# Patient Record
Sex: MALE | Race: BLACK OR AFRICAN AMERICAN | NOT HISPANIC OR LATINO | URBAN - METROPOLITAN AREA
[De-identification: names, ages, dates, MRNs, and addresses within clinical notes are randomized per-mention and may not be internally consistent; named-entity substitution may affect disease eponyms.]

---

## 2017-03-05 ENCOUNTER — EMERGENCY (EMERGENCY)
Facility: HOSPITAL | Age: 62
LOS: 1 days | Discharge: HOME | End: 2017-03-05
Attending: EMERGENCY MEDICINE | Admitting: EMERGENCY MEDICINE
Payer: COMMERCIAL

## 2017-03-05 VITALS
OXYGEN SATURATION: 98 % | WEIGHT: 250 LBS | TEMPERATURE: 98 F | RESPIRATION RATE: 18 BRPM | HEART RATE: 66 BPM | SYSTOLIC BLOOD PRESSURE: 154 MMHG | DIASTOLIC BLOOD PRESSURE: 100 MMHG

## 2017-03-05 DIAGNOSIS — M54.5 LOW BACK PAIN: ICD-10-CM

## 2017-03-05 DIAGNOSIS — Z79.899 OTHER LONG TERM (CURRENT) DRUG THERAPY: ICD-10-CM

## 2017-03-05 DIAGNOSIS — M54.9 DORSALGIA, UNSPECIFIED: ICD-10-CM

## 2017-03-05 DIAGNOSIS — I10 ESSENTIAL (PRIMARY) HYPERTENSION: ICD-10-CM

## 2017-03-05 DIAGNOSIS — Z79.1 LONG TERM (CURRENT) USE OF NON-STEROIDAL ANTI-INFLAMMATORIES (NSAID): ICD-10-CM

## 2017-03-05 PROCEDURE — 81003 URINALYSIS AUTO W/O SCOPE: CPT

## 2017-03-05 PROCEDURE — 99283 EMERGENCY DEPT VISIT LOW MDM: CPT

## 2017-03-05 PROCEDURE — 87086 URINE CULTURE/COLONY COUNT: CPT

## 2017-03-05 RX ORDER — TRAMADOL HYDROCHLORIDE 50 MG/1
50 TABLET ORAL ONCE
Qty: 0 | Refills: 0 | Status: DISCONTINUED | OUTPATIENT
Start: 2017-03-05 | End: 2017-03-05

## 2017-03-05 RX ADMIN — TRAMADOL HYDROCHLORIDE 50 MILLIGRAM(S): 50 TABLET ORAL at 12:34

## 2017-03-05 NOTE — ED ADULT TRIAGE NOTE - CHIEF COMPLAINT QUOTE
low back pain that started 2 weeks ago after doing crunches 2 weeks ago.  Hx: UTI. Denies burning on urination, hematuria, fever, chills, n/v/d.

## 2017-03-05 NOTE — ED PROVIDER NOTE - OBJECTIVE STATEMENT
The pt is a 60 y/o M, who presents to ED c/o R sided back pain x 1 mon. Pt states pain is 7/10, pain to R back, non radiating, aggravated w/mov, has not taken any pain meds. States that his "kidneys are not functioning at full level" - was told this over a yr ago, never f/u w/pmd. Denies cp, sob, n/v/d, dysuria, hematuria, fevers, chills, numbness or tingling to toes, back trauma

## 2017-03-05 NOTE — ED ADULT NURSE NOTE - PMH
<<----- Click to add NO pertinent Past Medical History GERD (gastroesophageal reflux disease)    HTN (hypertension)

## 2017-03-05 NOTE — ED ADULT NURSE NOTE - OBJECTIVE STATEMENT
Pt CO Right Flank Pain 8/10 x2 weeks radiating to Abd.  Pt states "My PMD has given me Ibuprofen but I don't want to take it because I don't like taking all this medication."  Pt also CO Polyuria.  Pt denies N/V/D, SOB, Fevers at this time.

## 2017-07-22 ENCOUNTER — EMERGENCY (EMERGENCY)
Facility: HOSPITAL | Age: 62
LOS: 1 days | Discharge: HOME | End: 2017-07-22
Attending: EMERGENCY MEDICINE | Admitting: EMERGENCY MEDICINE
Payer: COMMERCIAL

## 2017-07-22 VITALS
RESPIRATION RATE: 17 BRPM | TEMPERATURE: 98 F | HEART RATE: 69 BPM | OXYGEN SATURATION: 98 % | DIASTOLIC BLOOD PRESSURE: 76 MMHG | SYSTOLIC BLOOD PRESSURE: 145 MMHG

## 2017-07-22 VITALS
DIASTOLIC BLOOD PRESSURE: 73 MMHG | HEART RATE: 68 BPM | SYSTOLIC BLOOD PRESSURE: 122 MMHG | TEMPERATURE: 98 F | RESPIRATION RATE: 17 BRPM | OXYGEN SATURATION: 97 % | WEIGHT: 250 LBS | HEIGHT: 74.5 IN

## 2017-07-22 DIAGNOSIS — Z95.0 PRESENCE OF CARDIAC PACEMAKER: Chronic | ICD-10-CM

## 2017-07-22 DIAGNOSIS — E78.5 HYPERLIPIDEMIA, UNSPECIFIED: ICD-10-CM

## 2017-07-22 DIAGNOSIS — M54.9 DORSALGIA, UNSPECIFIED: ICD-10-CM

## 2017-07-22 DIAGNOSIS — M62.838 OTHER MUSCLE SPASM: ICD-10-CM

## 2017-07-22 DIAGNOSIS — I10 ESSENTIAL (PRIMARY) HYPERTENSION: ICD-10-CM

## 2017-07-22 DIAGNOSIS — Z79.899 OTHER LONG TERM (CURRENT) DRUG THERAPY: ICD-10-CM

## 2017-07-22 LAB
APPEARANCE UR: CLEAR — SIGNIFICANT CHANGE UP
BILIRUB UR-MCNC: NEGATIVE — SIGNIFICANT CHANGE UP
COLOR SPEC: YELLOW — SIGNIFICANT CHANGE UP
DIFF PNL FLD: NEGATIVE — SIGNIFICANT CHANGE UP
GLUCOSE UR QL: NEGATIVE — SIGNIFICANT CHANGE UP
KETONES UR-MCNC: NEGATIVE — SIGNIFICANT CHANGE UP
LEUKOCYTE ESTERASE UR-ACNC: NEGATIVE — SIGNIFICANT CHANGE UP
NITRITE UR-MCNC: NEGATIVE — SIGNIFICANT CHANGE UP
PH UR: 6 — SIGNIFICANT CHANGE UP (ref 5–8)
PROT UR-MCNC: NEGATIVE MG/DL — SIGNIFICANT CHANGE UP
SP GR SPEC: 1.02 — SIGNIFICANT CHANGE UP (ref 1–1.03)
UROBILINOGEN FLD QL: 0.2 E.U./DL — SIGNIFICANT CHANGE UP

## 2017-07-22 PROCEDURE — 71046 X-RAY EXAM CHEST 2 VIEWS: CPT

## 2017-07-22 PROCEDURE — 93005 ELECTROCARDIOGRAM TRACING: CPT

## 2017-07-22 PROCEDURE — 96372 THER/PROPH/DIAG INJ SC/IM: CPT

## 2017-07-22 PROCEDURE — 99284 EMERGENCY DEPT VISIT MOD MDM: CPT

## 2017-07-22 PROCEDURE — 71020: CPT | Mod: 26

## 2017-07-22 PROCEDURE — 81003 URINALYSIS AUTO W/O SCOPE: CPT

## 2017-07-22 PROCEDURE — 99285 EMERGENCY DEPT VISIT HI MDM: CPT | Mod: 25

## 2017-07-22 PROCEDURE — 99283 EMERGENCY DEPT VISIT LOW MDM: CPT | Mod: 25

## 2017-07-22 RX ORDER — CYCLOBENZAPRINE HYDROCHLORIDE 10 MG/1
1 TABLET, FILM COATED ORAL
Qty: 9 | Refills: 0 | OUTPATIENT
Start: 2017-07-22 | End: 2017-07-25

## 2017-07-22 RX ORDER — KETOROLAC TROMETHAMINE 30 MG/ML
30 SYRINGE (ML) INJECTION ONCE
Qty: 0 | Refills: 0 | Status: DISCONTINUED | OUTPATIENT
Start: 2017-07-22 | End: 2017-07-22

## 2017-07-22 RX ADMIN — Medication 30 MILLIGRAM(S): at 16:13

## 2017-07-22 RX ADMIN — Medication 30 MILLIGRAM(S): at 16:45

## 2017-07-22 NOTE — ED ADULT NURSE NOTE - CHPI ED SYMPTOMS NEG
no chills/no burning urination/no dysuria/no nausea/no abdominal distension/no blood in stool/no diarrhea/no hematuria

## 2017-07-22 NOTE — ED ADULT TRIAGE NOTE - CHIEF COMPLAINT QUOTE
"I have pain to the left side of my back that started about 10 days ago and now I also feel it by the right side of my stomach. The pain is horrible when I move". Pt states moving, walking and getting up in the morning is very difficulty. States he saw his urologist this last wednesday and he states everything was ok. Pt denies recent injury.

## 2017-07-22 NOTE — ED PROVIDER NOTE - OBJECTIVE STATEMENT
62yo M hx of HTN, HLD, BPH, pacemaker for bradycardic a fib, here with complaint of a week of initially mild L lateral back pain, now worse. Worse with position changes and deep breaths. No associated N/V/D, fevers/chills. No pain elsewhere. No neurological symptoms. Denies midline back pain. Was at a routine  appt when the pain had begun, at that time had blood work and urine done that was normal, his doctor thought likely MSK. However worsened today so came in. Not sure if he picked up anything particularly heavy, and no trauma that he is aware of.

## 2017-07-22 NOTE — ED ADULT NURSE NOTE - PMH
BPH (benign prostatic hyperplasia)    GERD (gastroesophageal reflux disease)    High cholesterol    HTN (hypertension)

## 2017-07-22 NOTE — ED PROVIDER NOTE - MEDICAL DECISION MAKING DETAILS
here with likely muscle spasm of L lateral back. Will trial pain meds, and reassess. anticipate dc home with plan for supportive care and f/u PMD

## 2017-07-22 NOTE — ED PROVIDER NOTE - PHYSICAL EXAMINATION
CONSTITUTIONAL: Well-appearing; well-nourished; in no apparent distress.   HEAD: Normocephalic; atraumatic.   EYES:  conjunctiva and sclera clear  ENT: normal nose; no rhinorrhea; normal pharynx with no erythema or lesions.   NECK: Supple; non-tender;   CARDIOVASCULAR: Normal S1, S2; no murmurs, rubs, or gallops. Regular rate and rhythm.   RESPIRATORY: Breathing easily; breath sounds clear and equal bilaterally; no wheezes, rhonchi, or rales.  GI: Soft; non-distended; non-tender; no palpable organomegaly. No CVA tenderness.   EXT: No cyanosis or edema; N/V intact  MSK: no midline spinal tenderness. +tenderness to palpation over L lateral thoracic back, palpation recreates pain  SKIN: Normal for age and race; warm; dry; good turgor; no apparent lesions or rash.   NEURO: A & O x 3; face symmetric; grossly unremarkable.   PSYCHOLOGICAL: The patient’s mood and manner are appropriate.

## 2018-03-01 ENCOUNTER — EMERGENCY (EMERGENCY)
Facility: HOSPITAL | Age: 63
LOS: 1 days | Discharge: HOME | End: 2018-03-01
Attending: EMERGENCY MEDICINE | Admitting: EMERGENCY MEDICINE
Payer: COMMERCIAL

## 2018-03-01 VITALS
WEIGHT: 255.07 LBS | TEMPERATURE: 99 F | RESPIRATION RATE: 16 BRPM | HEIGHT: 74 IN | DIASTOLIC BLOOD PRESSURE: 86 MMHG | HEART RATE: 70 BPM | OXYGEN SATURATION: 97 % | SYSTOLIC BLOOD PRESSURE: 134 MMHG

## 2018-03-01 VITALS
HEART RATE: 63 BPM | SYSTOLIC BLOOD PRESSURE: 157 MMHG | OXYGEN SATURATION: 98 % | DIASTOLIC BLOOD PRESSURE: 76 MMHG | TEMPERATURE: 100 F | RESPIRATION RATE: 16 BRPM

## 2018-03-01 DIAGNOSIS — Z79.1 LONG TERM (CURRENT) USE OF NON-STEROIDAL ANTI-INFLAMMATORIES (NSAID): ICD-10-CM

## 2018-03-01 DIAGNOSIS — J06.9 ACUTE UPPER RESPIRATORY INFECTION, UNSPECIFIED: ICD-10-CM

## 2018-03-01 DIAGNOSIS — Z95.0 PRESENCE OF CARDIAC PACEMAKER: ICD-10-CM

## 2018-03-01 DIAGNOSIS — E78.00 PURE HYPERCHOLESTEROLEMIA, UNSPECIFIED: ICD-10-CM

## 2018-03-01 DIAGNOSIS — R53.1 WEAKNESS: ICD-10-CM

## 2018-03-01 DIAGNOSIS — Z79.899 OTHER LONG TERM (CURRENT) DRUG THERAPY: ICD-10-CM

## 2018-03-01 DIAGNOSIS — Z95.0 PRESENCE OF CARDIAC PACEMAKER: Chronic | ICD-10-CM

## 2018-03-01 DIAGNOSIS — I10 ESSENTIAL (PRIMARY) HYPERTENSION: ICD-10-CM

## 2018-03-01 LAB — RAPID RVP RESULT: SIGNIFICANT CHANGE UP

## 2018-03-01 PROCEDURE — 96374 THER/PROPH/DIAG INJ IV PUSH: CPT

## 2018-03-01 PROCEDURE — 36415 COLL VENOUS BLD VENIPUNCTURE: CPT

## 2018-03-01 PROCEDURE — 82550 ASSAY OF CK (CPK): CPT

## 2018-03-01 PROCEDURE — 87581 M.PNEUMON DNA AMP PROBE: CPT

## 2018-03-01 PROCEDURE — 99285 EMERGENCY DEPT VISIT HI MDM: CPT | Mod: 25

## 2018-03-01 PROCEDURE — 87486 CHLMYD PNEUM DNA AMP PROBE: CPT

## 2018-03-01 PROCEDURE — 87633 RESP VIRUS 12-25 TARGETS: CPT

## 2018-03-01 PROCEDURE — 93010 ELECTROCARDIOGRAM REPORT: CPT

## 2018-03-01 PROCEDURE — 85025 COMPLETE CBC W/AUTO DIFF WBC: CPT

## 2018-03-01 PROCEDURE — 80053 COMPREHEN METABOLIC PANEL: CPT

## 2018-03-01 PROCEDURE — 81003 URINALYSIS AUTO W/O SCOPE: CPT

## 2018-03-01 PROCEDURE — 71046 X-RAY EXAM CHEST 2 VIEWS: CPT | Mod: 26

## 2018-03-01 PROCEDURE — 84484 ASSAY OF TROPONIN QUANT: CPT

## 2018-03-01 PROCEDURE — 85730 THROMBOPLASTIN TIME PARTIAL: CPT

## 2018-03-01 PROCEDURE — 93005 ELECTROCARDIOGRAM TRACING: CPT

## 2018-03-01 PROCEDURE — 71046 X-RAY EXAM CHEST 2 VIEWS: CPT

## 2018-03-01 PROCEDURE — 82553 CREATINE MB FRACTION: CPT

## 2018-03-01 PROCEDURE — 87798 DETECT AGENT NOS DNA AMP: CPT

## 2018-03-01 PROCEDURE — 85610 PROTHROMBIN TIME: CPT

## 2018-03-01 PROCEDURE — 99284 EMERGENCY DEPT VISIT MOD MDM: CPT | Mod: 25

## 2018-03-01 RX ORDER — ONDANSETRON 8 MG/1
4 TABLET, FILM COATED ORAL ONCE
Qty: 0 | Refills: 0 | Status: COMPLETED | OUTPATIENT
Start: 2018-03-01 | End: 2018-03-01

## 2018-03-01 RX ADMIN — ONDANSETRON 4 MILLIGRAM(S): 8 TABLET, FILM COATED ORAL at 19:00

## 2018-03-01 NOTE — ED ADULT TRIAGE NOTE - CHIEF COMPLAINT QUOTE
pt c/o scratchy throat, weakness, and increased urination since yesterday. pt has hx of pacemaker & kidney stones

## 2018-03-01 NOTE — ED ADULT NURSE NOTE - OBJECTIVE STATEMENT
pt c.o sore throat, weakness, intermittent left sided chest pain and  productive cough since this morning. denies any sob, fever/chills.

## 2018-03-01 NOTE — PROCEDURE NOTE - ADDITIONAL PROCEDURE DETAILS
Normal device function as programmed  Persistent AF (patient reports known h/o AF managed by Dr. Sood @ [facility])  No other arrhythmia events noted

## 2018-03-01 NOTE — ED PROVIDER NOTE - OBJECTIVE STATEMENT
61 y/o male with pacemaker, chronic afib, HTN, with throat itching, + cough, feels very tired, weak, + cough past 2-3 days, + body aches. patient is concerned that this is cardiac related. St he has had multiple syncopal episodes in the past.  no recent syncope. last episode 4 months ago.

## 2018-03-01 NOTE — ED PROVIDER NOTE - MEDICAL DECISION MAKING DETAILS
61 y/o male with ? flu like symptoms vs. cardiac related weakness. 1) Cardiac- EP evaluation, interrogation of pacemaker no sig arrythmia noted besides afib rate 55. troponin normal, CXR- pacemaker leads intact, no infiltrate, EKG - a fib, with wide QRS, prolonged QT 2) will send RVP, given Zofran. Will start on Tamiflu as at 2-3 days, tessalon perles

## 2018-03-10 ENCOUNTER — INPATIENT (INPATIENT)
Facility: HOSPITAL | Age: 63
LOS: 2 days | Discharge: HOME | DRG: 312 | End: 2018-03-13
Attending: INTERNAL MEDICINE | Admitting: INTERNAL MEDICINE
Payer: COMMERCIAL

## 2018-03-10 VITALS
RESPIRATION RATE: 18 BRPM | WEIGHT: 250 LBS | HEART RATE: 64 BPM | TEMPERATURE: 98 F | SYSTOLIC BLOOD PRESSURE: 137 MMHG | OXYGEN SATURATION: 98 % | DIASTOLIC BLOOD PRESSURE: 80 MMHG

## 2018-03-10 DIAGNOSIS — R55 SYNCOPE AND COLLAPSE: ICD-10-CM

## 2018-03-10 DIAGNOSIS — N18.9 CHRONIC KIDNEY DISEASE, UNSPECIFIED: ICD-10-CM

## 2018-03-10 DIAGNOSIS — E78.00 PURE HYPERCHOLESTEROLEMIA, UNSPECIFIED: ICD-10-CM

## 2018-03-10 DIAGNOSIS — Z95.0 PRESENCE OF CARDIAC PACEMAKER: Chronic | ICD-10-CM

## 2018-03-10 DIAGNOSIS — I48.1 PERSISTENT ATRIAL FIBRILLATION: ICD-10-CM

## 2018-03-10 DIAGNOSIS — I10 ESSENTIAL (PRIMARY) HYPERTENSION: ICD-10-CM

## 2018-03-10 DIAGNOSIS — J32.9 CHRONIC SINUSITIS, UNSPECIFIED: ICD-10-CM

## 2018-03-10 LAB
ALBUMIN SERPL ELPH-MCNC: 4.3 G/DL — SIGNIFICANT CHANGE UP (ref 3.3–5)
ALP SERPL-CCNC: 74 U/L — SIGNIFICANT CHANGE UP (ref 40–120)
ALT FLD-CCNC: 36 U/L — SIGNIFICANT CHANGE UP (ref 10–45)
ANION GAP SERPL CALC-SCNC: 11 MMOL/L — SIGNIFICANT CHANGE UP (ref 5–17)
ANION GAP SERPL CALC-SCNC: 17 MMOL/L — SIGNIFICANT CHANGE UP (ref 5–17)
APPEARANCE UR: CLEAR — SIGNIFICANT CHANGE UP
APTT BLD: 23.8 SEC — LOW (ref 27.5–37.4)
AST SERPL-CCNC: 36 U/L — SIGNIFICANT CHANGE UP (ref 10–40)
BASOPHILS NFR BLD AUTO: 0.2 % — SIGNIFICANT CHANGE UP (ref 0–2)
BILIRUB SERPL-MCNC: 0.4 MG/DL — SIGNIFICANT CHANGE UP (ref 0.2–1.2)
BILIRUB UR-MCNC: NEGATIVE — SIGNIFICANT CHANGE UP
BUN SERPL-MCNC: 14 MG/DL — SIGNIFICANT CHANGE UP (ref 7–23)
BUN SERPL-MCNC: 14 MG/DL — SIGNIFICANT CHANGE UP (ref 7–23)
CALCIUM SERPL-MCNC: 8.2 MG/DL — LOW (ref 8.4–10.5)
CALCIUM SERPL-MCNC: 8.8 MG/DL — SIGNIFICANT CHANGE UP (ref 8.4–10.5)
CHLORIDE SERPL-SCNC: 102 MMOL/L — SIGNIFICANT CHANGE UP (ref 96–108)
CHLORIDE SERPL-SCNC: 98 MMOL/L — SIGNIFICANT CHANGE UP (ref 96–108)
CHOLEST SERPL-MCNC: 199 MG/DL — SIGNIFICANT CHANGE UP (ref 10–199)
CK MB CFR SERPL CALC: 5.5 NG/ML — SIGNIFICANT CHANGE UP (ref 0–6.7)
CK SERPL-CCNC: 613 U/L — HIGH (ref 30–200)
CK SERPL-CCNC: 722 U/L — HIGH (ref 30–200)
CO2 SERPL-SCNC: 24 MMOL/L — SIGNIFICANT CHANGE UP (ref 22–31)
CO2 SERPL-SCNC: 27 MMOL/L — SIGNIFICANT CHANGE UP (ref 22–31)
COLOR SPEC: YELLOW — SIGNIFICANT CHANGE UP
CREAT SERPL-MCNC: 1.19 MG/DL — SIGNIFICANT CHANGE UP (ref 0.5–1.3)
CREAT SERPL-MCNC: 1.43 MG/DL — HIGH (ref 0.5–1.3)
DIFF PNL FLD: NEGATIVE — SIGNIFICANT CHANGE UP
EOSINOPHIL NFR BLD AUTO: 0.2 % — SIGNIFICANT CHANGE UP (ref 0–6)
GLUCOSE SERPL-MCNC: 102 MG/DL — HIGH (ref 70–99)
GLUCOSE SERPL-MCNC: 108 MG/DL — HIGH (ref 70–99)
GLUCOSE UR QL: NEGATIVE — SIGNIFICANT CHANGE UP
HCT VFR BLD CALC: 39.8 % — SIGNIFICANT CHANGE UP (ref 39–50)
HCT VFR BLD CALC: 44.2 % — SIGNIFICANT CHANGE UP (ref 39–50)
HDLC SERPL-MCNC: 34 MG/DL — LOW (ref 40–125)
HGB BLD-MCNC: 12.9 G/DL — LOW (ref 13–17)
HGB BLD-MCNC: 14.4 G/DL — SIGNIFICANT CHANGE UP (ref 13–17)
INR BLD: 1 — SIGNIFICANT CHANGE UP (ref 0.88–1.16)
KETONES UR-MCNC: NEGATIVE — SIGNIFICANT CHANGE UP
LEUKOCYTE ESTERASE UR-ACNC: NEGATIVE — SIGNIFICANT CHANGE UP
LIPID PNL WITH DIRECT LDL SERPL: 148 MG/DL — HIGH
LYMPHOCYTES # BLD AUTO: 24.5 % — SIGNIFICANT CHANGE UP (ref 13–44)
MAGNESIUM SERPL-MCNC: 2.1 MG/DL — SIGNIFICANT CHANGE UP (ref 1.6–2.6)
MAGNESIUM SERPL-MCNC: 2.3 MG/DL — SIGNIFICANT CHANGE UP (ref 1.6–2.6)
MCHC RBC-ENTMCNC: 30.8 PG — SIGNIFICANT CHANGE UP (ref 27–34)
MCHC RBC-ENTMCNC: 31.7 PG — SIGNIFICANT CHANGE UP (ref 27–34)
MCHC RBC-ENTMCNC: 32.4 G/DL — SIGNIFICANT CHANGE UP (ref 32–36)
MCHC RBC-ENTMCNC: 32.6 G/DL — SIGNIFICANT CHANGE UP (ref 32–36)
MCV RBC AUTO: 95 FL — SIGNIFICANT CHANGE UP (ref 80–100)
MCV RBC AUTO: 97.4 FL — SIGNIFICANT CHANGE UP (ref 80–100)
MONOCYTES NFR BLD AUTO: 7.1 % — SIGNIFICANT CHANGE UP (ref 2–14)
NEUTROPHILS NFR BLD AUTO: 68 % — SIGNIFICANT CHANGE UP (ref 43–77)
NITRITE UR-MCNC: NEGATIVE — SIGNIFICANT CHANGE UP
NT-PROBNP SERPL-SCNC: 666 PG/ML — HIGH (ref 0–300)
PCP SPEC-MCNC: SIGNIFICANT CHANGE UP
PH UR: 7.5 — SIGNIFICANT CHANGE UP (ref 5–8)
PHOSPHATE SERPL-MCNC: 3 MG/DL — SIGNIFICANT CHANGE UP (ref 2.5–4.5)
PLATELET # BLD AUTO: 176 K/UL — SIGNIFICANT CHANGE UP (ref 150–400)
PLATELET # BLD AUTO: 192 K/UL — SIGNIFICANT CHANGE UP (ref 150–400)
POTASSIUM SERPL-MCNC: 3.9 MMOL/L — SIGNIFICANT CHANGE UP (ref 3.5–5.3)
POTASSIUM SERPL-MCNC: 4.1 MMOL/L — SIGNIFICANT CHANGE UP (ref 3.5–5.3)
POTASSIUM SERPL-SCNC: 3.9 MMOL/L — SIGNIFICANT CHANGE UP (ref 3.5–5.3)
POTASSIUM SERPL-SCNC: 4.1 MMOL/L — SIGNIFICANT CHANGE UP (ref 3.5–5.3)
PROT SERPL-MCNC: 8.4 G/DL — HIGH (ref 6–8.3)
PROT UR-MCNC: NEGATIVE MG/DL — SIGNIFICANT CHANGE UP
PROTHROM AB SERPL-ACNC: 11.1 SEC — SIGNIFICANT CHANGE UP (ref 9.8–12.7)
RBC # BLD: 4.19 M/UL — LOW (ref 4.2–5.8)
RBC # BLD: 4.54 M/UL — SIGNIFICANT CHANGE UP (ref 4.2–5.8)
RBC # FLD: 12.7 % — SIGNIFICANT CHANGE UP (ref 10.3–16.9)
RBC # FLD: 13.1 % — SIGNIFICANT CHANGE UP (ref 10.3–16.9)
SODIUM SERPL-SCNC: 139 MMOL/L — SIGNIFICANT CHANGE UP (ref 135–145)
SODIUM SERPL-SCNC: 140 MMOL/L — SIGNIFICANT CHANGE UP (ref 135–145)
SP GR SPEC: 1.01 — SIGNIFICANT CHANGE UP (ref 1–1.03)
TOTAL CHOLESTEROL/HDL RATIO MEASUREMENT: 5.9 RATIO — SIGNIFICANT CHANGE UP (ref 3.4–9.6)
TRIGL SERPL-MCNC: 85 MG/DL — SIGNIFICANT CHANGE UP (ref 10–149)
TROPONIN T SERPL-MCNC: <0.01 NG/ML — SIGNIFICANT CHANGE UP (ref 0–0.01)
TROPONIN T SERPL-MCNC: <0.01 NG/ML — SIGNIFICANT CHANGE UP (ref 0–0.01)
TSH SERPL-MCNC: 0.26 UIU/ML — LOW (ref 0.35–4.94)
UROBILINOGEN FLD QL: 1 E.U./DL — SIGNIFICANT CHANGE UP
WBC # BLD: 4.2 K/UL — SIGNIFICANT CHANGE UP (ref 3.8–10.5)
WBC # BLD: 4.4 K/UL — SIGNIFICANT CHANGE UP (ref 3.8–10.5)
WBC # FLD AUTO: 4.2 K/UL — SIGNIFICANT CHANGE UP (ref 3.8–10.5)
WBC # FLD AUTO: 4.4 K/UL — SIGNIFICANT CHANGE UP (ref 3.8–10.5)

## 2018-03-10 PROCEDURE — 93010 ELECTROCARDIOGRAM REPORT: CPT

## 2018-03-10 PROCEDURE — 99285 EMERGENCY DEPT VISIT HI MDM: CPT | Mod: 25

## 2018-03-10 PROCEDURE — 93018 CV STRESS TEST I&R ONLY: CPT

## 2018-03-10 PROCEDURE — 78452 HT MUSCLE IMAGE SPECT MULT: CPT | Mod: 26

## 2018-03-10 PROCEDURE — 70450 CT HEAD/BRAIN W/O DYE: CPT | Mod: 26

## 2018-03-10 PROCEDURE — 93016 CV STRESS TEST SUPVJ ONLY: CPT

## 2018-03-10 RX ORDER — NIFEDIPINE 30 MG
0 TABLET, EXTENDED RELEASE 24 HR ORAL
Qty: 0 | Refills: 0 | COMMUNITY

## 2018-03-10 RX ORDER — ESOMEPRAZOLE MAGNESIUM 40 MG/1
0 CAPSULE, DELAYED RELEASE ORAL
Qty: 0 | Refills: 0 | COMMUNITY

## 2018-03-10 RX ORDER — ACETAMINOPHEN 500 MG
650 TABLET ORAL EVERY 6 HOURS
Qty: 0 | Refills: 0 | Status: DISCONTINUED | OUTPATIENT
Start: 2018-03-10 | End: 2018-03-13

## 2018-03-10 RX ORDER — INFLUENZA VIRUS VACCINE 15; 15; 15; 15 UG/.5ML; UG/.5ML; UG/.5ML; UG/.5ML
0.5 SUSPENSION INTRAMUSCULAR ONCE
Qty: 0 | Refills: 0 | Status: DISCONTINUED | OUTPATIENT
Start: 2018-03-10 | End: 2018-03-13

## 2018-03-10 RX ORDER — SODIUM CHLORIDE 9 MG/ML
1000 INJECTION INTRAMUSCULAR; INTRAVENOUS; SUBCUTANEOUS ONCE
Qty: 0 | Refills: 0 | Status: COMPLETED | OUTPATIENT
Start: 2018-03-10 | End: 2018-03-10

## 2018-03-10 RX ORDER — ASPIRIN/CALCIUM CARB/MAGNESIUM 324 MG
81 TABLET ORAL DAILY
Qty: 0 | Refills: 0 | Status: DISCONTINUED | OUTPATIENT
Start: 2018-03-10 | End: 2018-03-13

## 2018-03-10 RX ADMIN — SODIUM CHLORIDE 1000 MILLILITER(S): 9 INJECTION INTRAMUSCULAR; INTRAVENOUS; SUBCUTANEOUS at 00:52

## 2018-03-10 RX ADMIN — Medication 1 TABLET(S): at 02:43

## 2018-03-10 RX ADMIN — Medication 81 MILLIGRAM(S): at 15:16

## 2018-03-10 RX ADMIN — Medication 1 TABLET(S): at 17:00

## 2018-03-10 NOTE — H&P ADULT - PROBLEM SELECTOR PLAN 5
Pt with abnormal outpatient US and pending nephro appointment. s/p 1L IVF in ED - recheck am Cr and consider additional IVF.  Increased po intake encouraged.

## 2018-03-10 NOTE — PROGRESS NOTE ADULT - PROBLEM SELECTOR PLAN 5
Pt with abnormal outpatient US and pending nephro appointment. s/p 1L IVF in ED - BUN/Cr normalized this AM 14/1.19    DISPO: Pending clinical progression - continue augmentin (started 3/10) for sinusitis noted on CT head

## 2018-03-10 NOTE — H&P ADULT - PMH
Atrial fibrillation    BPH (benign prostatic hyperplasia)    GERD (gastroesophageal reflux disease)    High cholesterol    HTN (hypertension)    Syncope, unspecified syncope type

## 2018-03-10 NOTE — H&P ADULT - FAMILY HISTORY
No pertinent family history in first degree relatives Mother  Still living? Unknown  Coronary artery disease, Age at diagnosis: Age Unknown  Diabetes mellitus, type 2, Age at diagnosis: Age Unknown  Family history of hypertension, Age at diagnosis: Age Unknown

## 2018-03-10 NOTE — DIETITIAN INITIAL EVALUATION ADULT. - PROBLEM SELECTOR PLAN 1
Admit for tele monitoring.  PPM interrogated 3/1 with proper function, underlying AF.  Consider echo. Check TFTs, lytes. Hold Nifedipine.

## 2018-03-10 NOTE — DIETITIAN INITIAL EVALUATION ADULT. - OTHER INFO
61yo M with family h/o CAD, HTN, HLD, PPM with underlying AFIB presents with recurrent syncope.  Pt reports 4 to 5 episodes over the past 12 months with negative workup to date, including EEG, echo and stress test. Reports eating normally PTA, however eating a lot of [employer] & fast food. Wanted advice on changing diet & eating healthier. Discussed heart healthy diet & referral to outpatient.

## 2018-03-10 NOTE — ED PROVIDER NOTE - MEDICAL DECISION MAKING DETAILS
syncopal episode, no chest pain, no SOB, HA, however no focal neuro deficits, doubt CVA, doubt SAH, doutb PE  -check labs, ekg, ivf, ct head

## 2018-03-10 NOTE — PROGRESS NOTE ADULT - PROBLEM SELECTOR PLAN 6
Pt with abnormal outpatient US and pending nephro appointment. s/p 1L IVF in ED - BUN/Cr normalized this AM 14/1.19    DISPO: Pending clinical progression

## 2018-03-10 NOTE — H&P ADULT - HISTORY OF PRESENT ILLNESS
This is a 61yo M h/o HTN, HLD, Afib with PPM who presents to ED with recurrent syncope.    PPM interrogated here 3/1 showed normal function, normal battery, underlying Afib @ 55bpm. This is a 63yo M h/o HTN, HLD, Afib with PPM who presents to ED with recurrent syncope.    States was on the train, felt sudden onset nausea with vomiting, LOC x 20seconds witnessed by wife.  Pt reports h/o multiple syncopal episodes, EEG negative last year.  Was last seen at [facility] 3/1 for c/o flu-like symptoms, treated with tamiflu and tessalon perles, RVP negative. PPM interrogated here 3/1 showed normal function, normal battery, underlying Afib @ 55bpm.    In the ED tonight, VSS, EKG paced, CT head negative for acute CVA, c/w left sinusitis treated with Augmentin and 1L IVF.  Labs were notable for elevated Cr of 1.43 c/w prior. K 3.9, Mg 2.3, , Trop <0.01, . This is a 63yo M former 75 pack-year smoker, h/o HTN, HLD, Afib with PPM (reportedly secondary to bradycardia) who presents to ED with recurrent syncope.    States he had 2 alcohol drinks, felt well, was on the train, felt sudden onset nausea with vomiting, LOC x 20seconds with "generalized shaking" witnessed by wife.  Pt reports h/o 4 to 5 syncopal episodes over the past 12 months, EEG negative last year.  Was last seen at [facility] 3/1 for c/o flu-like symptoms, treated with tessalon perles, RVP negative. PPM interrogated here 3/1 showed normal function, normal battery, underlying Afib @ 55bpm.  Pt reports he was told to hold daily nifedipine for 3 months but has not done so.  He reports a negative stress test and echo in December; has pending nephrology appointment for possible adrenal cyst, renal cyst and nephrolithiasis.  He denies chest pain, SOB, decreased exercise tolerance or any symptoms to suggest CHF.  He does report poor water intake, attempting to improve.    In the ED tonight, VSS, EKG paced, CT head negative for acute CVA, c/w left sinusitis treated with Augmentin and 1L IVF.  Labs were notable for elevated Cr of 1.43 c/w prior. K 3.9, Mg 2.3, , Trop <0.01, .

## 2018-03-10 NOTE — PROGRESS NOTE ADULT - SUBJECTIVE AND OBJECTIVE BOX
Pt is admitted for recurrent episodic syncope , the lasteast event last evening after dinner    PAST MEDICAL & SURGICAL HISTORY:  Syncope, unspecified syncope type  Atrial fibrillation  High cholesterol  BPH (benign prostatic hyperplasia)  GERD (gastroesophageal reflux disease)  HTN (hypertension)  Cardiac pacemaker    MEDICATIONS  (STANDING):  influenza   Vaccine 0.5 milliLiter(s) IntraMuscular once    MEDICATIONS  (PRN):  acetaminophen   Tablet. 650 milliGRAM(s) Oral every 6 hours PRN Mild Pain (1 - 3)  aluminum hydroxide/magnesium hydroxide/simethicone Suspension 30 milliLiter(s) Oral every 6 hours PRN Dyspepsia    Home Medications:  NIFEdipine 30 mg oral tablet, extended release: 1 tab(s) orally once a day (10 Mar 2018 07:12)    Lungs clear  CV s1 s2  Abd soft  Ext stable                          12.9   4.2   )-----------( 176      ( 10 Mar 2018 07:34 )             39.8   03-10    140  |  102  |  14  ----------------------------<  102<H>  4.1   |  27  |  1.19    Ca    8.2<L>      10 Mar 2018 07:34  Phos  3.0     03-10  Mg     2.1     03-10    TPro  8.4<H>  /  Alb  4.3  /  TBili  0.4  /  DBili  x   /  AST  36  /  ALT  36  /  AlkPhos  74  03-10  CARDIAC MARKERS ( 10 Mar 2018 07:34 )  x     / <0.01 ng/mL / 613 U/L / x     / x      CARDIAC MARKERS ( 10 Mar 2018 00:40 )  x     / <0.01 ng/mL / 722 U/L / x     / 5.5 ng/mL    CXR 3/1/18   clear lungs    CT Head  Sinusitis   Neg Head CT for hemorrhage or infarction   microangiopathic disease Pt is admitted for recurrent episodic syncope , the lasteast event last evening after dinner    PAST MEDICAL & SURGICAL HISTORY:  Syncope, unspecified syncope type  Atrial fibrillation  High cholesterol  BPH (benign prostatic hyperplasia)  GERD (gastroesophageal reflux disease)  HTN (hypertension)  Cardiac pacemaker    MEDICATIONS  (STANDING):  influenza   Vaccine 0.5 milliLiter(s) IntraMuscular once    MEDICATIONS  (PRN):  acetaminophen   Tablet. 650 milliGRAM(s) Oral every 6 hours PRN Mild Pain (1 - 3)  aluminum hydroxide/magnesium hydroxide/simethicone Suspension 30 milliLiter(s) Oral every 6 hours PRN Dyspepsia    Home Medications:  NIFEdipine 30 mg oral tablet, extended release: 1 tab(s) orally once a day (10 Mar 2018 07:12)    Lungs clear  CV s1 s2  Abd soft  Ext stable    EKG Paced                           12.9   4.2   )-----------( 176      ( 10 Mar 2018 07:34 )             39.8   03-10    140  |  102  |  14  ----------------------------<  102<H>  4.1   |  27  |  1.19    Ca    8.2<L>      10 Mar 2018 07:34  Phos  3.0     03-10  Mg     2.1     03-10    TPro  8.4<H>  /  Alb  4.3  /  TBili  0.4  /  DBili  x   /  AST  36  /  ALT  36  /  AlkPhos  74  03-10  CARDIAC MARKERS ( 10 Mar 2018 07:34 )  x     / <0.01 ng/mL / 613 U/L / x     / x      CARDIAC MARKERS ( 10 Mar 2018 00:40 )  x     / <0.01 ng/mL / 722 U/L / x     / 5.5 ng/mL    CXR 3/1/18   clear lungs    CT Head  Sinusitis   Neg Head CT for hemorrhage or infarction   microangiopathic disease

## 2018-03-10 NOTE — PROGRESS NOTE ADULT - SUBJECTIVE AND OBJECTIVE BOX
Interventional Cardiology PA Adult Progress Note    CC: "I keep fainting"    Subjective Assessment: Patient was seen and examined this AM and was asymptomatic without complaints. Denies CP or SOB.    12 point ROS otherwise negative except per subjective  	  MEDICATIONS:  acetaminophen   Tablet. 650 milliGRAM(s) Oral every 6 hours PRN  aluminum hydroxide/magnesium hydroxide/simethicone Suspension 30 milliLiter(s) Oral every 6 hours PRN  	    [PHYSICAL EXAM:  TELEMETRY:  T(C): 36.4 (03-10-18 @ 10:14), Max: 36.9 (03-10-18 @ 01:53)  HR: 63 (03-10-18 @ 08:30) (60 - 65)  BP: 129/80 (03-10-18 @ 08:30) (125/67 - 147/80)  RR: 16 (03-10-18 @ 08:30) (16 - 18)  SpO2: 95% (03-10-18 @ 08:30) (95% - 99%)  Wt(kg): --  I&O's Summary    09 Mar 2018 07:01  -  10 Mar 2018 07:00  --------------------------------------------------------  IN: 0 mL / OUT: 150 mL / NET: -150 mL    10 Mar 2018 06:01  -  10 Mar 2018 13:23  --------------------------------------------------------  IN: 120 mL / OUT: 0 mL / NET: 120 mL      Height (cm): 187.96 (03-10 @ 04:13)  Weight (kg): 113.3 (03-10 @ 04:13)  BMI (kg/m2): 32.1 (03-10 @ 04:13)  BSA (m2): 2.39 (03-10 @ 04:13)                                   Appearance: Normal	  HEENT:   Normal oral mucosa, PERRL, EOMI	  Neck: Supple, - JVD; No Carotid Bruit   Cardiovascular: Normal S1 S2, No JVD, No murmurs  Respiratory: Lungs clear to auscultation, No Rales, Rhonchi, Wheezing	  Gastrointestinal:  Soft, Non-tender, + BS	  Skin: No rashes, No ecchymoses, No cyanosis  Extremities: Normal range of motion, No clubbing, cyanosis or edema  Vascular: Peripheral pulses palpable 2+ bilaterally  Neurologic: Non-focal  Psychiatry: A & O x 3, Mood & affect appropriate	    LABS:	 	  CARDIAC MARKERS:                        12.9   4.2   )-----------( 176      ( 10 Mar 2018 07:34 )             39.8     03-10    140  |  102  |  14  ----------------------------<  102<H>  4.1   |  27  |  1.19    Ca    8.2<L>      10 Mar 2018 07:34  Phos  3.0     03-10  Mg     2.1     03-10    TPro  8.4<H>  /  Alb  4.3  /  TBili  0.4  /  DBili  x   /  AST  36  /  ALT  36  /  AlkPhos  74  03-10    proBNP: Serum Pro-Brain Natriuretic Peptide: 666 pg/mL (03-10 @ 00:40)    TSH: Thyroid Stimulating Hormone, Serum: 0.264 uIU/mL (03-10 @ 07:34)    PT/INR - ( 10 Mar 2018 00:40 )   PT: 11.1 sec;   INR: 1.00          PTT - ( 10 Mar 2018 00:40 )  PTT:23.8 sec

## 2018-03-10 NOTE — H&P ADULT - PROBLEM SELECTOR PLAN 4
Check lipid panel. Check lipid panel with aml.  Pt has recently declined statin therapy, attempting diet/lifestyle modification.

## 2018-03-10 NOTE — DIETITIAN INITIAL EVALUATION ADULT. - PROBLEM SELECTOR PLAN 4
Check lipid panel with aml.  Pt has recently declined statin therapy, attempting diet/lifestyle modification.

## 2018-03-10 NOTE — ED PROVIDER NOTE - PROGRESS NOTE DETAILS
sinusitis on CT, pt with multiple syncopal episodes over past few months, will admit to tele for continued monitoring.

## 2018-03-10 NOTE — H&P ADULT - NSHPLABSRESULTS_GEN_ALL_CORE
PT/INR - ( 10 Mar 2018 00:40 )   PT: 11.1 sec;   INR: 1.00          PTT - ( 10 Mar 2018 00:40 )  PTT:23.8 sec  LIVER FUNCTIONS - ( 10 Mar 2018 00:40 )  Alb: 4.3 g/dL / Pro: 8.4 g/dL / ALK PHOS: 74 U/L / ALT: 36 U/L / AST: 36 U/L / GGT: x           CBC Full  -  ( 10 Mar 2018 00:40 )  WBC Count : 4.4 K/uL  Hemoglobin : 14.4 g/dL  Hematocrit : 44.2 %  Platelet Count - Automated : 192 K/uL  Mean Cell Volume : 97.4 fL  Mean Cell Hemoglobin : 31.7 pg  Mean Cell Hemoglobin Concentration : 32.6 g/dL  Auto Neutrophil # : x  Auto Lymphocyte # : x  Auto Monocyte # : x  Auto Eosinophil # : x  Auto Basophil # : x  Auto Neutrophil % : 68.0 %  Auto Lymphocyte % : 24.5 %  Auto Monocyte % : 7.1 %  Auto Eosinophil % : 0.2 %  Auto Basophil % : 0.2 %    CARDIAC MARKERS ( 10 Mar 2018 00:40 )  x     / <0.01 ng/mL / 722 U/L / x     / 5.5 ng/mL      10 Mar 2018 00:40    139    |  98     |  14     ----------------------------<  108    3.9     |  24     |  1.43     Ca    8.8        10 Mar 2018 00:40  Mg     2.3       10 Mar 2018 00:40    TPro  8.4    /  Alb  4.3    /  TBili  0.4    /  DBili  x      /  AST  36     /  ALT  36     /  AlkPhos  74     10 Mar 2018 00:40

## 2018-03-10 NOTE — PROGRESS NOTE ADULT - PROBLEM SELECTOR PLAN 2
V-paced   - consider AC: CHADS-VASC 1 and patient with frequent syncopal episodes; currently holding 2/2 high fall risk and low stroke risk, d/w Dr. Vu; started ASA 81mg QD

## 2018-03-10 NOTE — DIETITIAN INITIAL EVALUATION ADULT. - NS AS NUTRI INTERV ED CONTENT
Heart healthy diet - increasing fiber, fruits/veggies. Less processed foods & more whole foods. Monitoring sodium intake & referral to outpatient RD

## 2018-03-10 NOTE — H&P ADULT - ASSESSMENT
61yo M with family h/o CAD, HTN, HLD, PPM with underlying AFIB presents with recurrent syncope.  Pt reports 4 to 5 episodes over the past 12 months with negative workup to date, including EEG, echo and stress test.  Admit for tele monitoring, further management.

## 2018-03-10 NOTE — ED ADULT TRIAGE NOTE - CHIEF COMPLAINT QUOTE
pt BIBA from home with nausea s/p syncopal episode witnessed by wife no head trauma CP SOB reports a hx of similar in the past

## 2018-03-10 NOTE — PROGRESS NOTE ADULT - ASSESSMENT
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

## 2018-03-10 NOTE — PROGRESS NOTE ADULT - ASSESSMENT
Syncope    for echo  stress testing and EEG Syncope    for echo  stress testing and EEG  HX A Fib   consider re starting anticoagulation with Eliquis     EPS to check PPM

## 2018-03-10 NOTE — ED ADULT NURSE NOTE - OBJECTIVE STATEMENT
61 yo M c.o syncopal episode.  Pt wife at bedside states "we were on the train and he said he wasn't feeling well when he became very sweaty the looked like he had a seizure and his eyes were rolling back in his head and he had stuff coming out of his mouth for like 20 seconds".  Pt states denies pain or sob at this time and states he "feels very tired".  Pt states he has a history of these kind of episodes with the last being a year ago in which he went to [place] and he was kept over night for observation and let go in the morning.  Pt has a pacemaker placed in W denies head trauma fever, chills. recent travel and states he was in [facility] March 1st for a virus.  Pt denies use of blood thinners and has no edema noted to bl upper or lower extremities. Pt is stable and placed on cont pulse ox and telemetry monitoring at this time. EKG done with SR noted. MD Steel at bedside. Will continue to monitor.

## 2018-03-10 NOTE — ED PROVIDER NOTE - OBJECTIVE STATEMENT
62M hx htn, high chol, afib, pacemaker, c/o syncopal episode. [place], suddenly felt nauseated and lightheaded.  states vomited a little.  then passed out.  wife states his eyes rolled back and his arms were shaking.  states she was unable to rouse him for about 20seconds.  states he has had multiple syncopal episodes over past few years.  states had EEG a few years ago that was unremarkable.  wife states he frequently falls asleep.  c/o HA x1 month.  no numbness/weakness.

## 2018-03-10 NOTE — ED PROVIDER NOTE - PMH
Atrial fibrillation    BPH (benign prostatic hyperplasia)    GERD (gastroesophageal reflux disease)    High cholesterol    HTN (hypertension)

## 2018-03-10 NOTE — H&P ADULT - PROBLEM SELECTOR PLAN 1
Admit for tele monitoring.  PPM interrogated 3/1 with proper function, underlying AF.  Consider echo. Check TFTs, lytes. Admit for tele monitoring.  PPM interrogated 3/1 with proper function, underlying AF.  Consider echo. Check TFTs, lytes. Hold Nifedipine.

## 2018-03-11 LAB
ANION GAP SERPL CALC-SCNC: 12 MMOL/L — SIGNIFICANT CHANGE UP (ref 5–17)
BUN SERPL-MCNC: 17 MG/DL — SIGNIFICANT CHANGE UP (ref 7–23)
CALCIUM SERPL-MCNC: 9 MG/DL — SIGNIFICANT CHANGE UP (ref 8.4–10.5)
CHLORIDE SERPL-SCNC: 104 MMOL/L — SIGNIFICANT CHANGE UP (ref 96–108)
CO2 SERPL-SCNC: 26 MMOL/L — SIGNIFICANT CHANGE UP (ref 22–31)
CREAT SERPL-MCNC: 1.34 MG/DL — HIGH (ref 0.5–1.3)
GLUCOSE SERPL-MCNC: 90 MG/DL — SIGNIFICANT CHANGE UP (ref 70–99)
HCT VFR BLD CALC: 45.8 % — SIGNIFICANT CHANGE UP (ref 39–50)
HGB BLD-MCNC: 15.1 G/DL — SIGNIFICANT CHANGE UP (ref 13–17)
MAGNESIUM SERPL-MCNC: 2.2 MG/DL — SIGNIFICANT CHANGE UP (ref 1.6–2.6)
MCHC RBC-ENTMCNC: 32.1 PG — SIGNIFICANT CHANGE UP (ref 27–34)
MCHC RBC-ENTMCNC: 33 G/DL — SIGNIFICANT CHANGE UP (ref 32–36)
MCV RBC AUTO: 97.2 FL — SIGNIFICANT CHANGE UP (ref 80–100)
PLATELET # BLD AUTO: 238 K/UL — SIGNIFICANT CHANGE UP (ref 150–400)
POTASSIUM SERPL-MCNC: 4.3 MMOL/L — SIGNIFICANT CHANGE UP (ref 3.5–5.3)
POTASSIUM SERPL-SCNC: 4.3 MMOL/L — SIGNIFICANT CHANGE UP (ref 3.5–5.3)
RBC # BLD: 4.71 M/UL — SIGNIFICANT CHANGE UP (ref 4.2–5.8)
RBC # FLD: 13.1 % — SIGNIFICANT CHANGE UP (ref 10.3–16.9)
SODIUM SERPL-SCNC: 142 MMOL/L — SIGNIFICANT CHANGE UP (ref 135–145)
T3FREE SERPL-MCNC: 2.43 PG/ML — SIGNIFICANT CHANGE UP (ref 1.71–3.71)
T4 FREE SERPL-MCNC: 1.1 NG/DL — SIGNIFICANT CHANGE UP (ref 0.7–1.48)
WBC # BLD: 4.5 K/UL — SIGNIFICANT CHANGE UP (ref 3.8–10.5)
WBC # FLD AUTO: 4.5 K/UL — SIGNIFICANT CHANGE UP (ref 3.8–10.5)

## 2018-03-11 PROCEDURE — 95951: CPT | Mod: 26

## 2018-03-11 RX ORDER — HEPARIN SODIUM 5000 [USP'U]/ML
5000 INJECTION INTRAVENOUS; SUBCUTANEOUS EVERY 8 HOURS
Qty: 0 | Refills: 0 | Status: DISCONTINUED | OUTPATIENT
Start: 2018-03-11 | End: 2018-03-13

## 2018-03-11 RX ADMIN — Medication 1 TABLET(S): at 05:52

## 2018-03-11 RX ADMIN — HEPARIN SODIUM 5000 UNIT(S): 5000 INJECTION INTRAVENOUS; SUBCUTANEOUS at 14:50

## 2018-03-11 RX ADMIN — Medication 81 MILLIGRAM(S): at 11:57

## 2018-03-11 RX ADMIN — HEPARIN SODIUM 5000 UNIT(S): 5000 INJECTION INTRAVENOUS; SUBCUTANEOUS at 22:02

## 2018-03-11 RX ADMIN — Medication 1 TABLET(S): at 18:13

## 2018-03-11 NOTE — PROGRESS NOTE ADULT - PROBLEM SELECTOR PLAN 5
-CT Head w/o (3/10/2018):  Air-fluid level in the left maxillary sinus suggestive of acute sinusitis  -Augmentin BID x five days (initiated 3/10/2018)  -Afebrile, no leukocytosis.

## 2018-03-11 NOTE — PROGRESS NOTE ADULT - SUBJECTIVE AND OBJECTIVE BOX
Interventional Cardiology PA Adult Progress Note    CC: Syncope  Subjective Assessment: Pt seen and examined at bedside this morning. Pt currently with complaints. Expresses he wants to know etiology of his syncope and prior work up has been negative in past.    12 point review of system otherwise negative except per subjective  	  MEDICATIONS:    amoxicillin  875 milliGRAM(s)/clavulanate 1 Tablet(s) Oral two times a day      acetaminophen   Tablet. 650 milliGRAM(s) Oral every 6 hours PRN    aluminum hydroxide/magnesium hydroxide/simethicone Suspension 30 milliLiter(s) Oral every 6 hours PRN      aspirin  chewable 81 milliGRAM(s) Oral daily  influenza   Vaccine 0.5 milliLiter(s) IntraMuscular once      [PHYSICAL EXAM:  TELEMETRY:  T(C): 36.3 (03-11-18 @ 08:37), Max: 36.9 (03-10-18 @ 22:40)  HR: 65 (03-11-18 @ 11:59) (61 - 69)  BP: 142/65 (03-11-18 @ 11:59) (136/84 - 146/83)  RR: 16 (03-11-18 @ 11:59) (16 - 16)  SpO2: 98% (03-11-18 @ 11:59) (97% - 100%)  Wt(kg): --  I&O's Summary    10 Mar 2018 06:01  -  11 Mar 2018 07:00  --------------------------------------------------------  IN: 720 mL / OUT: 525 mL / NET: 195 mL    11 Mar 2018 07:01  -  11 Mar 2018 13:33  --------------------------------------------------------  IN: 180 mL / OUT: 0 mL / NET: 180 mL    Gen: NAD, resting comfortable in bed  Neck: No JVD b/l  Cardiac: +S1, S2, RRR  Pulm: CTA b/l  Abdomen: BS present, soft, NT, ND  Extremities: No C/C/E b/l  Neuro: A+Ox3.       	    LABS:	 REFUSED LABS TODAY	                                     12.9   4.2   )-----------( 176      ( 10 Mar 2018 07:34 )             39.8     03-11    142  |  104  |  17  ----------------------------<  90  4.3   |  26  |  1.34<H>    Ca    9.0      11 Mar 2018 12:59  Phos  3.0     03-10  Mg     2.2     03-11    TPro  8.4<H>  /  Alb  4.3  /  TBili  0.4  /  DBili  x   /  AST  36  /  ALT  36  /  AlkPhos  74  03-10       PT/INR - ( 10 Mar 2018 00:40 )   PT: 11.1 sec;   INR: 1.00          PTT - ( 10 Mar 2018 00:40 )  PTT:23.8 sec Interventional Cardiology PA Adult Progress Note    CC: Syncope  Subjective Assessment: Pt seen and examined at bedside this morning. Pt currently with complaints. Expresses he wants to know etiology of his syncope and prior work up has been negative in past.    12 point review of system otherwise negative except per subjective  	  MEDICATIONS:    amoxicillin  875 milliGRAM(s)/clavulanate 1 Tablet(s) Oral two times a day      acetaminophen   Tablet. 650 milliGRAM(s) Oral every 6 hours PRN    aluminum hydroxide/magnesium hydroxide/simethicone Suspension 30 milliLiter(s) Oral every 6 hours PRN      aspirin  chewable 81 milliGRAM(s) Oral daily  influenza   Vaccine 0.5 milliLiter(s) IntraMuscular once      [PHYSICAL EXAM:  TELEMETRY:  T(C): 36.3 (03-11-18 @ 08:37), Max: 36.9 (03-10-18 @ 22:40)  HR: 65 (03-11-18 @ 11:59) (61 - 69)  BP: 142/65 (03-11-18 @ 11:59) (136/84 - 146/83)  RR: 16 (03-11-18 @ 11:59) (16 - 16)  SpO2: 98% (03-11-18 @ 11:59) (97% - 100%)  Wt(kg): --  I&O's Summary    10 Mar 2018 06:01  -  11 Mar 2018 07:00  --------------------------------------------------------  IN: 720 mL / OUT: 525 mL / NET: 195 mL    11 Mar 2018 07:01  -  11 Mar 2018 13:33  --------------------------------------------------------  IN: 180 mL / OUT: 0 mL / NET: 180 mL    Gen: NAD, resting comfortable in bed  Neck: No JVD b/l  Cardiac: +S1, S2, RRR  Pulm: CTA b/l  Abdomen: BS present, soft, NT, ND  Extremities: No C/C/E b/l  Neuro: A+Ox3.       	    LABS:	                                       12.9   4.2   )-----------( 176      ( 10 Mar 2018 07:34 )             39.8     03-11    142  |  104  |  17  ----------------------------<  90  4.3   |  26  |  1.34<H>    Ca    9.0      11 Mar 2018 12:59  Phos  3.0     03-10  Mg     2.2     03-11    TPro  8.4<H>  /  Alb  4.3  /  TBili  0.4  /  DBili  x   /  AST  36  /  ALT  36  /  AlkPhos  74  03-10       PT/INR - ( 10 Mar 2018 00:40 )   PT: 11.1 sec;   INR: 1.00          PTT - ( 10 Mar 2018 00:40 )  PTT:23.8 sec

## 2018-03-11 NOTE — PROGRESS NOTE ADULT - PROBLEM SELECTOR PLAN 6
-Refused labs today  -CR on admission: 1.43 s/p 1 L of fluid with improvement to 1.19 yesterday    Dispo: Echo in AM. NPO after midnight for Tilt Table Test. -CR on admission: 1.43 s/p 1 L of fluid on admission. Baseline CR unknown: CR 1.34 today    Dispo: Echo in AM. NPO after midnight for Tilt Table Test.

## 2018-03-11 NOTE — PROGRESS NOTE ADULT - SUBJECTIVE AND OBJECTIVE BOX
EEG begun       PAST MEDICAL & SURGICAL HISTORY:  Syncope, unspecified syncope type  Atrial fibrillation  High cholesterol  BPH (benign prostatic hyperplasia)  GERD (gastroesophageal reflux disease)  HTN (hypertension)  Cardiac pacemaker    ICU Vital Signs Last 24 Hrs  T(C): 36.3 (11 Mar 2018 08:37), Max: 36.9 (10 Mar 2018 22:40)  T(F): 97.3 (11 Mar 2018 08:37), Max: 98.5 (10 Mar 2018 22:40)  HR: 62 (11 Mar 2018 09:00) (61 - 69)  BP: 146/83 (11 Mar 2018 09:00) (116/79 - 146/83)  BP(mean): --  ABP: --  ABP(mean): --  RR: 16 (11 Mar 2018 09:00) (16 - 16)  SpO2: 98% (11 Mar 2018 09:00) (97% - 100%)    MEDICATIONS  (STANDING):  amoxicillin  875 milliGRAM(s)/clavulanate 1 Tablet(s) Oral two times a day  aspirin  chewable 81 milliGRAM(s) Oral daily  influenza   Vaccine 0.5 milliLiter(s) IntraMuscular once    MEDICATIONS  (PRN):  acetaminophen   Tablet. 650 milliGRAM(s) Oral every 6 hours PRN Mild Pain (1 - 3)  aluminum hydroxide/magnesium hydroxide/simethicone Suspension 30 milliLiter(s) Oral every 6 hours PRN Dyspepsia    Lungs clear  CV s1 s2   Abd soft  Ext stable                          12.9   4.2   )-----------( 176      ( 10 Mar 2018 07:34 )             39.8   03-10    140  |  102  |  14  ----------------------------<  102<H>  4.1   |  27  |  1.19    Ca    8.2<L>      10 Mar 2018 07:34  Phos  3.0     03-10  Mg     2.1     03-10    TPro  8.4<H>  /  Alb  4.3  /  TBili  0.4  /  DBili  x   /  AST  36  /  ALT  36  /  AlkPhos  74  03-10

## 2018-03-11 NOTE — PROGRESS NOTE ADULT - PROBLEM SELECTOR PLAN 2
-Medtronic PPM (last interrogation 3/1/2018): persistent Afib; no acute events  -Outpatient: Dr. Elizabeth ([facility]).  -Has been on Eliquis in past but self-discontinued. CHADSVASC score 1.  Patient is currently not willing take Eliquis. Ordered for ASA 81 mg daily. -Medtronic PPM (last interrogation 3/1/2018): persistent Afib; no acute events  -Outpatient: Dr. Elizabeth ([facility]).  -Has been on Eliquis in past but self-discontinued. CHADSVASC score 1.  Patient is currently not willing take Eliquis/Xarelto/Coumadin therapy and would like to discuss it further with Dr. Vu in AM. Ordered for ASA 81 mg daily.

## 2018-03-11 NOTE — PROGRESS NOTE ADULT - ASSESSMENT
63yo male former 75 pack-year smoker, h/o HTN, HLD, persistent Afib (was on Eliquis in past and self-discontinued), s/p Medtronic PPM (last interrogation 3/1/2018; Dr. Elizabeth @ [facility]) who presented to [facility] ED (3/10/2018) s/p recurrent syncope with admission to [facility] for further work up.

## 2018-03-12 ENCOUNTER — TRANSCRIPTION ENCOUNTER (OUTPATIENT)
Age: 63
End: 2018-03-12

## 2018-03-12 DIAGNOSIS — R93.1 ABNORMAL FINDINGS ON DIAGNOSTIC IMAGING OF HEART AND CORONARY CIRCULATION: ICD-10-CM

## 2018-03-12 DIAGNOSIS — N18.3 CHRONIC KIDNEY DISEASE, STAGE 3 (MODERATE): ICD-10-CM

## 2018-03-12 LAB
ANION GAP SERPL CALC-SCNC: 12 MMOL/L — SIGNIFICANT CHANGE UP (ref 5–17)
BUN SERPL-MCNC: 18 MG/DL — SIGNIFICANT CHANGE UP (ref 7–23)
CALCIUM SERPL-MCNC: 8.4 MG/DL — SIGNIFICANT CHANGE UP (ref 8.4–10.5)
CHLORIDE SERPL-SCNC: 103 MMOL/L — SIGNIFICANT CHANGE UP (ref 96–108)
CO2 SERPL-SCNC: 28 MMOL/L — SIGNIFICANT CHANGE UP (ref 22–31)
CREAT SERPL-MCNC: 1.32 MG/DL — HIGH (ref 0.5–1.3)
GLUCOSE SERPL-MCNC: 94 MG/DL — SIGNIFICANT CHANGE UP (ref 70–99)
HCT VFR BLD CALC: 43.7 % — SIGNIFICANT CHANGE UP (ref 39–50)
HGB BLD-MCNC: 14.2 G/DL — SIGNIFICANT CHANGE UP (ref 13–17)
MAGNESIUM SERPL-MCNC: 2.1 MG/DL — SIGNIFICANT CHANGE UP (ref 1.6–2.6)
MCHC RBC-ENTMCNC: 31.3 PG — SIGNIFICANT CHANGE UP (ref 27–34)
MCHC RBC-ENTMCNC: 32.5 G/DL — SIGNIFICANT CHANGE UP (ref 32–36)
MCV RBC AUTO: 96.5 FL — SIGNIFICANT CHANGE UP (ref 80–100)
PLATELET # BLD AUTO: 229 K/UL — SIGNIFICANT CHANGE UP (ref 150–400)
POTASSIUM SERPL-MCNC: 3.9 MMOL/L — SIGNIFICANT CHANGE UP (ref 3.5–5.3)
POTASSIUM SERPL-SCNC: 3.9 MMOL/L — SIGNIFICANT CHANGE UP (ref 3.5–5.3)
RBC # BLD: 4.53 M/UL — SIGNIFICANT CHANGE UP (ref 4.2–5.8)
RBC # FLD: 13.1 % — SIGNIFICANT CHANGE UP (ref 10.3–16.9)
SODIUM SERPL-SCNC: 143 MMOL/L — SIGNIFICANT CHANGE UP (ref 135–145)
WBC # BLD: 4.4 K/UL — SIGNIFICANT CHANGE UP (ref 3.8–10.5)
WBC # FLD AUTO: 4.4 K/UL — SIGNIFICANT CHANGE UP (ref 3.8–10.5)

## 2018-03-12 PROCEDURE — 99254 IP/OBS CNSLTJ NEW/EST MOD 60: CPT

## 2018-03-12 PROCEDURE — 93280 PM DEVICE PROGR EVAL DUAL: CPT | Mod: 26

## 2018-03-12 PROCEDURE — 95813 EEG EXTND MNTR 61-119 MIN: CPT | Mod: 26

## 2018-03-12 PROCEDURE — 93306 TTE W/DOPPLER COMPLETE: CPT | Mod: 26

## 2018-03-12 RX ORDER — ASPIRIN/CALCIUM CARB/MAGNESIUM 324 MG
1 TABLET ORAL
Qty: 30 | Refills: 3 | OUTPATIENT
Start: 2018-03-12 | End: 2018-07-09

## 2018-03-12 RX ADMIN — HEPARIN SODIUM 5000 UNIT(S): 5000 INJECTION INTRAVENOUS; SUBCUTANEOUS at 13:34

## 2018-03-12 RX ADMIN — Medication 81 MILLIGRAM(S): at 11:20

## 2018-03-12 RX ADMIN — HEPARIN SODIUM 5000 UNIT(S): 5000 INJECTION INTRAVENOUS; SUBCUTANEOUS at 05:58

## 2018-03-12 RX ADMIN — HEPARIN SODIUM 5000 UNIT(S): 5000 INJECTION INTRAVENOUS; SUBCUTANEOUS at 22:03

## 2018-03-12 RX ADMIN — Medication 1 TABLET(S): at 17:21

## 2018-03-12 RX ADMIN — Medication 1 TABLET(S): at 05:58

## 2018-03-12 NOTE — DISCHARGE NOTE ADULT - CARE PLAN
Principal Discharge DX:	Syncope  Goal:	Please follow up with your cardiologist within one week of discharge or Dr. Vu.  Assessment and plan of treatment:	-You were admitted to the telemetry unit for further work up of syncope (LOC/Passing out).   -No acute events were noted on your heart monitor.   -You had a recent pacemaker interrogation (3/1/2018) which did not reveal any events that may contribute to your syncopal events.   -Your Cat Scan of your head was negative for stroke or bleed.   -Your EEG was negative for seizure or epileptic activity.  - You were evaluated by our electrophysiologist Dr. Garza who recommend a Tilt Table test to further determine the etiology/cause of your syncope/loss of consciousness. We underwent you declined this test and recommend you follow up with Dr. Love upon discharge for further work up.   -In addition, we had Neurologist Dr. Laura evaluate you and your tests you have completed and no further work up is needed at this time from neurological perspective.   -Please follow up with your cardiologist within one week of discharge or Dr. Vu.  Secondary Diagnosis:	Cardiac pacemaker  Goal:	Please follow up with Dr. Love (your Electrophysiologist) in 1-2 weeks for a check up  Assessment and plan of treatment:	Your pacemaker was interrogated (3/1/2018) and did not reveal any acute events that may attribute to your syncope.  Secondary Diagnosis:	High cholesterol  Goal:	Please follow up with your cardiologist or Dr. Vu  Assessment and plan of treatment:	Your cholesterol panel is abnormal. Your LDL is 148 mg/dL and your goal LDL is less than 100 mg/dL. LDL is also known as "bad cholesterol" because it takes cholesterol to your arteries, where it may collect in artery walls. Too much cholesterol in your arteries may lead to a buildup of plaque known as atherosclerosis and can cause heart disease. In addition your HDL is 34 mg/dL and your goal HDL is greater than 45 mg/dL. The higher the HDL the better; HDL is known as “good cholesterol” because it transports cholesterol to your liver to be expelled from your body.   -We are aware you would like to try and adjust these numbers by changing your diet and exercising. Please make sure you have these levels repeated within three months to ensure they are improving.  Secondary Diagnosis:	HTN (hypertension)  Goal:	We underwent you were instructed to stop taking your Nifedipine prescription. We recommend you resume this as we have not found any source of your syncope/passing out episodes and this medication will help control your blood pressure and rate control your Atrial Fibrillation.  Assessment and plan of treatment:	Please follow up with your cardiologist or Dr. Vu  Secondary Diagnosis:	Persistent atrial fibrillation  Goal:	Please follow up with Dr. Love (your Electrophysiologist) in 1-2 weeks for a check up  Assessment and plan of treatment:	You have a known history of Atrial Fibrillation which is an irregular heart beat that puts you at risk of having a stroke. We recommend you take a blood thinner such as Xarelto or Eliquis to prevent stroke from happening. Please discuss this further with your Electrophysiologist.  Secondary Diagnosis:	Sinusitis  Goal:	Please complete your five day course of Augmentin you have (three more days left).  Assessment and plan of treatment:	Your Cat Scan of your head incidentally found you have an infection in your sinuses. Please make sure you complete your Antibiotic course: Augmentin 1 tab orally twice a day (initiated 3/10/2018-last dose 3/14/2018 to complete five day course). Principal Discharge DX:	Syncope  Goal:	Please follow up with your cardiologist within one week of discharge or Dr. Vu.  Assessment and plan of treatment:	-You were admitted to the telemetry unit for further work up of syncope (LOC/Passing out).   -No acute events were noted on your heart monitor.   -You had a recent pacemaker interrogation (3/1/2018) which did not reveal any events that may contribute to your syncopal events.   -Your Cat Scan of your head was negative for stroke or bleed.   -Your EEG was negative for seizure or epileptic activity.  - You were evaluated by our electrophysiologist Dr. Garza who recommend a Tilt Table test to further determine the etiology/cause of your syncope/loss of consciousness. We underwent you declined this test and recommend you follow up with Dr. Love upon discharge for further work up.   -In addition, we had Neurologist Dr. Laura evaluate you and your tests you have completed and no further work up is needed at this time from neurological perspective.   -Please follow up with your cardiologist within one week of discharge or Dr. Vu.  Secondary Diagnosis:	Cardiac pacemaker  Goal:	Please follow up with Dr. Love (your Electrophysiologist) in 1-2 weeks for a check up  Assessment and plan of treatment:	Your pacemaker was interrogated (3/1/2018) and did not reveal any acute events that may attribute to your syncope.  Secondary Diagnosis:	High cholesterol  Goal:	Please follow up with your cardiologist or Dr. Vu  Assessment and plan of treatment:	Your cholesterol panel is abnormal. Your LDL is 148 mg/dL and your goal LDL is less than 100 mg/dL. LDL is also known as "bad cholesterol" because it takes cholesterol to your arteries, where it may collect in artery walls. Too much cholesterol in your arteries may lead to a buildup of plaque known as atherosclerosis and can cause heart disease. In addition your HDL is 34 mg/dL and your goal HDL is greater than 45 mg/dL. The higher the HDL the better; HDL is known as “good cholesterol” because it transports cholesterol to your liver to be expelled from your body.   -We are aware you would like to try and adjust these numbers by changing your diet and exercising. Please make sure you have these levels repeated within three months to ensure they are improving.  Secondary Diagnosis:	HTN (hypertension)  Goal:	We underwent you were instructed to stop taking your Nifedipine prescription. We recommend you resume this as we have not found any source of your syncope/passing out episodes and this medication will help control your blood pressure and rate control your Atrial Fibrillation.  Assessment and plan of treatment:	Please follow up with your cardiologist or Dr. Vu  Secondary Diagnosis:	Persistent atrial fibrillation  Goal:	Please follow up with Dr. Love (your Electrophysiologist) in 1-2 weeks for a check up  Assessment and plan of treatment:	You have a known history of Atrial Fibrillation which is an irregular heart beat that puts you at risk of having a stroke. We recommend you take a blood thinner such as Xarelto or Eliquis to prevent stroke from happening.  We recommend you take Aspirin 81 mg daily in the mean to time. Please discuss this further with your Electrophysiologist.  Secondary Diagnosis:	Sinusitis  Goal:	Please complete your five day course of Augmentin you have (three more days left).  Assessment and plan of treatment:	Your Cat Scan of your head incidentally found you have an infection in your sinuses. Please make sure you complete your Antibiotic course: Augmentin 1 tab orally twice a day (initiated 3/10/2018-last dose 3/14/2018 to complete five day course). Principal Discharge DX:	Syncope  Goal:	Please follow up with your cardiologist within one week of discharge or Dr. Vu.  Assessment and plan of treatment:	-You were admitted to the telemetry unit for further work up of syncope (LOC/Passing out).   -No acute events were noted on your heart monitor.   -You had a recent pacemaker interrogation (3/1/2018) which did not reveal any events that may contribute to your syncopal events.   -Your CT Scan of your head was negative for stroke or bleed.   -Your EEG was negative for seizure or epileptic activity.  - You were evaluated by our electrophysiologist Dr. Garza who recommend a Tilt Table test to further determine the etiology/cause of your syncope/loss of consciousness. We underwent you declined this test and recommend you follow up with Dr. Love upon discharge for further work up.   -In addition, we had Neurologist Dr. Laura evaluate you and your tests you have completed and no further work up is needed at this time from neurological perspective.   -Please follow up with your cardiologist within one week of discharge or Dr. Vu.  Secondary Diagnosis:	Cardiac pacemaker  Goal:	Please follow up with Dr. Love (your Electrophysiologist) in 1-2 weeks for a check up  Assessment and plan of treatment:	Your pacemaker was interrogated (3/1/2018) and did not reveal any acute events that may attribute to your syncope.  Secondary Diagnosis:	High cholesterol  Goal:	Please follow up with your cardiologist or Dr. Vu  Assessment and plan of treatment:	Your cholesterol panel is abnormal. Your LDL is 148 mg/dL and your goal LDL is less than 100 mg/dL. LDL is also known as "bad cholesterol" because it takes cholesterol to your arteries, where it may collect in artery walls. Too much cholesterol in your arteries may lead to a buildup of plaque known as atherosclerosis and can cause heart disease. In addition your HDL is 34 mg/dL and your goal HDL is greater than 45 mg/dL. The higher the HDL the better; HDL is known as “good cholesterol” because it transports cholesterol to your liver to be expelled from your body.   -We are aware you would like to try and adjust these numbers by changing your diet and exercising. Please make sure you have these levels repeated within three months to ensure they are improving.  Secondary Diagnosis:	HTN (hypertension)  Goal:	You were instructed to stop taking your Nifedipine prescription. We recommend you resume this as we have not found any source of your syncope/passing out episodes and this medication will help control your blood pressure and rate control your Atrial Fibrillation.  Assessment and plan of treatment:	Please follow up with your cardiologist or Dr. Vu  Secondary Diagnosis:	Persistent atrial fibrillation  Goal:	Please follow up with Dr. Love (your Electrophysiologist) in 1-2 weeks for a check up  Assessment and plan of treatment:	You have a known history of Atrial Fibrillation which is an irregular heart beat that puts you at risk of having a stroke. We recommend you take a blood thinner such as Xarelto or Eliquis to prevent stroke from happening.  We recommend you take Aspirin 81 mg daily in the mean to time. Please discuss this further with your Electrophysiologist.  Secondary Diagnosis:	Sinusitis  Goal:	Please complete your five day course of Augmentin you have (three more days left).  Assessment and plan of treatment:	Your CT Scan of your head incidentally found you have an infection in your sinuses. Please make sure you complete your Antibiotic course: Augmentin 1 tab orally twice a day (initiated 3/10/2018-last dose 3/14/2018 to complete five day course).

## 2018-03-12 NOTE — PROGRESS NOTE ADULT - PROBLEM SELECTOR PLAN 3
- Hold home nifedipine as pt's cardiologist wanted it discontinued prior to admission, consider norvasc if BP elevated.
- -Medtronic PPM (last interrogation 3/1/2018): persistent Afib; no acute events  -Outpatient EP: Dr. Elizabeth ([facility]).  -Has been on Eliquis in past but self-discontinued. CHADSVASC score 1.  Patient is currently not willing take Eliquis/Xarelto/Coumadin therapy at this time. Dr. Vu and PA staff have discussed risk/benefits with patient and wife at bedside.
-Patient was taking Nifidepine at home originally which was stopped by his cardiologist.   -SBP: 120-130 mmHG. Will add Norvasc is BP uptrends.

## 2018-03-12 NOTE — PROGRESS NOTE ADULT - SUBJECTIVE AND OBJECTIVE BOX
Interventional Cardiology PA Adult Progress Note    CC: Recurrent syncope    Subjective Assessment: Pt seen and examined at bedside several times today. Not willing to do Tilt Table test. Pt denies chest pain, palpitations, dizizness, fever, chills    12 point review of systems otherwise negative except per subjective.   	  MEDICATIONS:    amoxicillin  875 milliGRAM(s)/clavulanate 1 Tablet(s) Oral two times a day      acetaminophen   Tablet. 650 milliGRAM(s) Oral every 6 hours PRN    aluminum hydroxide/magnesium hydroxide/simethicone Suspension 30 milliLiter(s) Oral every 6 hours PRN      aspirin  chewable 81 milliGRAM(s) Oral daily  heparin  Injectable 5000 Unit(s) SubCutaneous every 8 hours  influenza   Vaccine 0.5 milliLiter(s) IntraMuscular once      	    [PHYSICAL EXAM:  TELEMETRY: 3 runs of NSVT (max 8 beats)  T(C): 36.1 (03-12-18 @ 14:03), Max: 36.4 (03-11-18 @ 21:25)  HR: 62 (03-12-18 @ 11:25) (60 - 69)  BP: 146/86 (03-12-18 @ 11:25) (132/90 - 148/91)  RR: 16 (03-12-18 @ 11:25) (16 - 16)  SpO2: 98% (03-12-18 @ 11:25) (97% - 99%)  Wt(kg): --  I&O's Summary    11 Mar 2018 07:01  -  12 Mar 2018 07:00  --------------------------------------------------------  IN: 777 mL / OUT: 475 mL / NET: 302 mL    12 Mar 2018 07:01  -  12 Mar 2018 14:26  --------------------------------------------------------  IN: 0 mL / OUT: 0 mL / NET: 0 mL      Gen: NAD, resting comfortable in bed  Neck: No JVD b/l  Cardiac: +S1, S2, RRR  Pulm: CTA b/l  Abdomen: BS present, soft, NT, ND  Extremities: No C/C/E b/l  Neuro: A+Ox3.       LABS:	 	                                     14.2   4.4   )-----------( 229      ( 12 Mar 2018 06:24 )             43.7     03-12    143  |  103  |  18  ----------------------------<  94  3.9   |  28  |  1.32<H>    Ca    8.4      12 Mar 2018 06:24  Mg     2.1     03-12

## 2018-03-12 NOTE — PROGRESS NOTE ADULT - PROBLEM SELECTOR PROBLEM 6
Chronic kidney disease, unspecified CKD stage
Sinusitis
Chronic kidney disease, unspecified CKD stage

## 2018-03-12 NOTE — CONSULT NOTE ADULT - ASSESSMENT
61yo M h/o HTN, HLD, Afib (no a/c because of frequent LOC and falls and [id number] of 1), with Medtronic PPM (reportedly secondary to bradycardia) who presented to ED on 3/10/18 with recurrent syncope.  PPM was interrogated on 3/1/18 here with normal function (pt is not pacing dependent).   - We offered him a tilt table study today to attempt to evaluate for vasodepressive etiology for his recurrent syncope. I explained to him how the test is to be done. However, pt is declining TTT. He doesn't want to be a "guinea pig for this test" and doesn't want to pass out again.    - EPS is signing off.

## 2018-03-12 NOTE — DISCHARGE NOTE ADULT - PATIENT PORTAL LINK FT
You can access the [facility] Patient Portal, offered by [facility], by registering with the following website: [email or web address]

## 2018-03-12 NOTE — PROGRESS NOTE ADULT - PROBLEM SELECTOR PLAN 6
-CT Head w/o (3/10/2018):  Air-fluid level in the left maxillary sinus suggestive of acute sinusitis  -Augmentin BID x five days (initiated 3/10/2018, day 2 of 5)  -Afebrile, no leukocytosis.

## 2018-03-12 NOTE — DISCHARGE NOTE ADULT - CARE PROVIDER_API CALL
Bhanu Vu), Medicine  [address] [place] [address]  Phone: [phone]  Fax: [phone]    Charlie Laura), Electrodiagnostic Medicine; Neurology  [address] [address]  Phone: [phone]  Fax: [phone]    Jasen Garza), Cardiac Electrophysiology; Cardiology; Cardiovascular Disease  [address] [place] [address]  Phone: [phone]  Fax: [phone]

## 2018-03-12 NOTE — DISCHARGE NOTE ADULT - ADDITIONAL INSTRUCTIONS
Please follow up with your cardiologist within one week of discharge. The cardiologist seeing you while you were admitted was Dr. Vu; you may follow up with him within one week of discharge if you do not have a clinical cardiologist.   Please follow up with Dr. Love in 1-2 weeks for a check up.

## 2018-03-12 NOTE — DISCHARGE NOTE ADULT - PLAN OF CARE
Please follow up with your cardiologist within one week of discharge or Dr. Vu. -You were admitted to the telemetry unit for further work up of syncope (LOC/Passing out).   -No acute events were noted on your heart monitor.   -You had a recent pacemaker interrogation (3/1/2018) which did not reveal any events that may contribute to your syncopal events.   -Your Cat Scan of your head was negative for stroke or bleed.   -Your EEG was negative for seizure or epileptic activity.  - You were evaluated by our electrophysiologist Dr. Garza who recommend a Tilt Table test to further determine the etiology/cause of your syncope/loss of consciousness. We underwent you declined this test and recommend you follow up with Dr. Love upon discharge for further work up.   -In addition, we had Neurologist Dr. Laura evaluate you and your tests you have completed and no further work up is needed at this time from neurological perspective.   -Please follow up with your cardiologist within one week of discharge or Dr. Vu. Please follow up with Dr. Love (your Electrophysiologist) in 1-2 weeks for a check up Your pacemaker was interrogated (3/1/2018) and did not reveal any acute events that may attribute to your syncope. Please follow up with your cardiologist or Dr. Vu Your cholesterol panel is abnormal. Your LDL is 148 mg/dL and your goal LDL is less than 100 mg/dL. LDL is also known as "bad cholesterol" because it takes cholesterol to your arteries, where it may collect in artery walls. Too much cholesterol in your arteries may lead to a buildup of plaque known as atherosclerosis and can cause heart disease. In addition your HDL is 34 mg/dL and your goal HDL is greater than 45 mg/dL. The higher the HDL the better; HDL is known as “good cholesterol” because it transports cholesterol to your liver to be expelled from your body.   -We are aware you would like to try and adjust these numbers by changing your diet and exercising. Please make sure you have these levels repeated within three months to ensure they are improving. We underwent you were instructed to stop taking your Nifedipine prescription. We recommend you resume this as we have not found any source of your syncope/passing out episodes and this medication will help control your blood pressure and rate control your Atrial Fibrillation. You have a known history of Atrial Fibrillation which is an irregular heart beat that puts you at risk of having a stroke. We recommend you take a blood thinner such as Xarelto or Eliquis to prevent stroke from happening. Please discuss this further with your Electrophysiologist. Please complete your five day course of Augmentin you have (three more days left). Your Cat Scan of your head incidentally found you have an infection in your sinuses. Please make sure you complete your Antibiotic course: Augmentin 1 tab orally twice a day (initiated 3/10/2018-last dose 3/14/2018 to complete five day course). You have a known history of Atrial Fibrillation which is an irregular heart beat that puts you at risk of having a stroke. We recommend you take a blood thinner such as Xarelto or Eliquis to prevent stroke from happening.  We recommend you take Aspirin 81 mg daily in the mean to time. Please discuss this further with your Electrophysiologist. -You were admitted to the telemetry unit for further work up of syncope (LOC/Passing out).   -No acute events were noted on your heart monitor.   -You had a recent pacemaker interrogation (3/1/2018) which did not reveal any events that may contribute to your syncopal events.   -Your CT Scan of your head was negative for stroke or bleed.   -Your EEG was negative for seizure or epileptic activity.  - You were evaluated by our electrophysiologist Dr. Garza who recommend a Tilt Table test to further determine the etiology/cause of your syncope/loss of consciousness. We underwent you declined this test and recommend you follow up with Dr. Love upon discharge for further work up.   -In addition, we had Neurologist Dr. Laura evaluate you and your tests you have completed and no further work up is needed at this time from neurological perspective.   -Please follow up with your cardiologist within one week of discharge or Dr. Vu. You were instructed to stop taking your Nifedipine prescription. We recommend you resume this as we have not found any source of your syncope/passing out episodes and this medication will help control your blood pressure and rate control your Atrial Fibrillation. Your CT Scan of your head incidentally found you have an infection in your sinuses. Please make sure you complete your Antibiotic course: Augmentin 1 tab orally twice a day (initiated 3/10/2018-last dose 3/14/2018 to complete five day course).

## 2018-03-12 NOTE — PROGRESS NOTE ADULT - PROBLEM SELECTOR PLAN 7
-CR on admission: 1.43 s/p 1 L of fluid on admission. Baseline CR unknown: CR 1.32 today    Dispo: Limited Echo with Contrast tomorrow to assess WMA and NPO after midnight for NST. F/U Fax for further Echo and NST records from prior.

## 2018-03-12 NOTE — PROGRESS NOTE ADULT - SUBJECTIVE AND OBJECTIVE BOX
EEG in progress   Neuro consult appreciated   s/p syncope     PAST MEDICAL & SURGICAL HISTORY:  Syncope, unspecified syncope type  Atrial fibrillation  High cholesterol  BPH (benign prostatic hyperplasia)  GERD (gastroesophageal reflux disease)  HTN (hypertension)  Cardiac pacemaker    MEDICATIONS  (STANDING):  amoxicillin  875 milliGRAM(s)/clavulanate 1 Tablet(s) Oral two times a day  aspirin  chewable 81 milliGRAM(s) Oral daily  heparin  Injectable 5000 Unit(s) SubCutaneous every 8 hours  influenza   Vaccine 0.5 milliLiter(s) IntraMuscular once    MEDICATIONS  (PRN):  acetaminophen   Tablet. 650 milliGRAM(s) Oral every 6 hours PRN Mild Pain (1 - 3)  aluminum hydroxide/magnesium hydroxide/simethicone Suspension 30 milliLiter(s) Oral every 6 hours PRN Dyspepsia    ICU Vital Signs Last 24 Hrs  T(C): 36.4 (12 Mar 2018 06:35), Max: 36.4 (11 Mar 2018 21:25)  T(F): 97.5 (12 Mar 2018 06:35), Max: 97.6 (11 Mar 2018 21:25)  HR: 61 (12 Mar 2018 06:40) (60 - 68)  BP: 132/90 (12 Mar 2018 06:40) (132/90 - 146/83)  BP(mean): --  ABP: --  ABP(mean): --  RR: 16 (12 Mar 2018 05:57) (16 - 16)  SpO2: 97% (12 Mar 2018 05:57) (97% - 99%)      Lungs clear  CXR neg  CV s1 s2  Abd soft  Ext stable    CT Head   neg for acute process

## 2018-03-12 NOTE — PROGRESS NOTE ADULT - ASSESSMENT
61yo male former 75 pack-year smoker, h/o HTN, HLD, persistent Afib (was on Eliquis in past and self-discontinued), s/p Medtronic PPM (last interrogation 3/1/2018; Dr. Elizabeth @ [facility]) who presented to [facility] ED (3/10/2018) s/p recurrent syncope with admission to [facility] for further work up.

## 2018-03-12 NOTE — DISCHARGE NOTE ADULT - CARE PROVIDERS DIRECT ADDRESSES
[email or web address]

## 2018-03-12 NOTE — PROGRESS NOTE ADULT - PROBLEM SELECTOR PLAN 1
-EP: Dr. Garza, Neurologist: Dr. Laura consulted  -Pt reports recurrent syncope (4-5 episodes within last 12 months) with negative work up in past.  -Medtronic PPM (last interrogation 3/1/2018): persistent Afib; no acute events.   - EP recommended tilt table study today to attempt to evaluate for vasodepressive etiology for his recurrent syncope. But patient refused;EP signing off patient.   -s/p EEG (3/11/2018): negative for seizure activity.    -CT Head w/o (3/10/2018):  negative for infarct/hemorrhage, Air-fluid level in the left maxillary sinus suggestive of acute sinusitis. Mild prominence of the lateral and third ventricles. Mild microangiopathic disease of the periventricular white matter.  -No further neurological work up at this time per Dr. Laura.  -Echo (3/12/2018): LA moderately dilated, RA dilated, mild AV thickening, mild MR, trace TR/VA, RV dilated, mild LVH, Accurate assessment of regional wall motion is limited due to foreshortening and poor endocardial delineation. Appears to be apical inferior, apical anterior, apical septal and apical lateral wall hypokinesis. LVEF 45-50%. When compared to prior study performed on 5/18/15, regional wall motion is new. Recommend limited echocardiogram with contrast for better regional wall motion assessment (**which will be performed tomorrow; as Echo schedule is limited today**).  -In addition, pt ordered for Exercise Nuclear Stress Test in AM in the setting of WMA and recurrent syncope; NPO after midnight  -Pt reports he had normal Echo and NST last year; contacted Dr. Martinez [phone] (patient’s urologist) who has copy of Echo and NST/ IGNACIO faxed to [phone]; F/U in trace.
EP: Dr. Garza, Neurologist: Dr. Laura consulted  -Pt reports recurrent syncope (4-5 episodes within last 12 months) with negative work up in past.   -EEG ordered; patient only agreeable to wear to 4 PM today. Prior EEG negative per patient.   -CT Head w/o (3/10/2018):  negative for infarct/hemorrhage, Air-fluid level in the left maxillary sinus suggestive of acute sinusitis. Mild prominence of the lateral and third ventricles. Mild microangiopathic disease of the periventricular white matter.  -Echo ordered for AM.   -Dr. Garza has evaluated patient today: NPO after midnight for Tilt Table Test in AM   -Medtronic PPM (last interrogation 3/1/2018): persistent Afib; no acute events.   -Reports negative Echo and NST last year.   -UTox negative on admission.
- monitor on telemetry  - PPM interrogated 3/1 and only revealed underlying afib  - consult EP  - trops negative x 2  - follow up 24hr EEG  - follow up echo, consider NST Monday per Dr. Vu  - TSH low, follow up T3 and T4  - UA negative

## 2018-03-12 NOTE — DISCHARGE NOTE ADULT - MEDICATION SUMMARY - MEDICATIONS TO TAKE
I will START or STAY ON the medications listed below when I get home from the hospital:    aspirin 81 mg oral tablet, chewable  -- 1 tab(s) by mouth once a day  -- Indication: For BLOOD THINNER TO PREVENT HEART ATTACK/STROKE    NIFEdipine 30 mg oral tablet, extended release  -- 1 tab(s) by mouth once a day  -- Indication: For BLOOD PRESSURE/HEART RATE CONTROL FOR ATRIAL FIBRILLATION    amoxicillin-clavulanate 875 mg-125 mg oral tablet  -- 1 tab(s) by mouth 2 times a day  -- Indication: For Sinusitis: DAY 3 OUT OF 5 I will START or STAY ON the medications listed below when I get home from the hospital:    aspirin 81 mg oral tablet, chewable  -- 1 tab(s) by mouth once a day  -- Indication: For BLOOD THINNER TO PREVENT HEART ATTACK/STROKE    amoxicillin-clavulanate 875 mg-125 mg oral tablet  -- 1 tab(s) by mouth 2 times a day  -- Indication: For Sinusitis: DAY 3 OUT OF 5 I will START or STAY ON the medications listed below when I get home from the hospital:    aspirin 81 mg oral tablet, chewable  -- 1 tab(s) by mouth once a day  -- Indication: For BLOOD THINNER TO PREVENT HEART ATTACK/STROKE    NIFEdipine 30 mg oral tablet, extended release  -- 1 tab(s) by mouth once a day   -- Avoid grapefruit and grapefruit juice while taking this medication.  It is very important that you take or use this exactly as directed.  Do not skip doses or discontinue unless directed by your doctor.  Some non-prescription drugs may aggravate your condition.  Read all labels carefully.  If a warning appears, check with your doctor before taking.  Swallow whole.  Do not crush.    -- Indication: For High blood pressure, atrial fibrillation    amoxicillin-clavulanate 875 mg-125 mg oral tablet  -- 1 tab(s) by mouth 2 times a day  -- Indication: For Sinusitis: DAY 4 OUT OF 5 complete. Please take last two prescribed tablets tomorrow.

## 2018-03-12 NOTE — PROGRESS NOTE ADULT - PROBLEM SELECTOR PLAN 2
-Echo (3/12/2018): apical inferior, apical anterior, apical septal and apical lateral wall hypokinesis. New compared to Echo from 2015  -Pt reports he had Echo in December and NST; contacted Dr. Martinez [phone] (patient’s urologist) who has copy of Echo and NST/ IGNACIO faxed to [phone]; F/U in trace.    -Plan for Limited Echo with contrast to further assess WMA tomorrow  -NPO after midnight for Nuclear Stress Test in AM

## 2018-03-12 NOTE — PROGRESS NOTE ADULT - PROBLEM SELECTOR PLAN 4
- . Pt has recently declined statin therapy, attempting diet/lifestyle modification.
- . Pt has recently declined statin therapy, attempting diet/lifestyle modification.
-Patient was taking Nifidepine at home originally which patient reports he was told to stop by his cardiologist by never did  -Will consider addition of Norvasc if BP uptrends.

## 2018-03-12 NOTE — CONSULT NOTE ADULT - SUBJECTIVE AND OBJECTIVE BOX
HISTORY OF PRESENT ILLNESS:   63yo M h/o HTN, HLD, Afib (no a/c because of frequent LOC and falls and [id number] of 1), with Medtronic PPM (reportedly secondary to bradycardia) who presented to ED on 3/10/18 with recurrent syncope.    States he had 2 alcohol drinks, felt well, was on the train, felt sudden onset nausea with vomiting, LOC x 20seconds with "generalized shaking" witnessed by wife.  Pt reports h/o 4 to 5 syncopal episodes over the past 12 months, EEG negative last year.  Was last seen at [facility] 3/1 for c/o flu-like symptoms, treated with tessalon perles, RVP negative. PPM was interrogated here on 3/1/18; showed normal function, normal battery, underlying Afib @ 55bpm.  Pt reports he was told to hold daily nifedipine for 3 months but has not done so.  He reports a negative stress test and echo in December; has pending nephrology appointment for possible adrenal cyst, renal cyst and nephrolithiasis.  He denies chest pain, SOB, decreased exercise tolerance or any symptoms to suggest CHF.  He does report poor water intake, attempting to improve.    He just finished EEG this morning.   EPS is called for evaluation of syncope and to do Tilt table study to rule out vasodepressive etiology.       PAST MEDICAL AND SURGICAL HISTORY:  Syncope, unspecified syncope type  Atrial fibrillation  High cholesterol  BPH (benign prostatic hyperplasia)  GERD (gastroesophageal reflux disease)  HTN (hypertension)  Cardiac pacemaker      FAMILY HISTORY:  Family history of hypertension (Mother)  Diabetes mellitus, type 2 (Mother)  Coronary artery disease (Mother)      SOCIAL HISTORY:   Retired [employer] manager  Lives with wife  Prior 2ppd tobacco d/c 2012  Rare EtOH  Denies illicit drugs    REVIEW OF SYSTEM:     · Negative General Symptoms- no fever; no chills; no fatigue  · ENMT- negative  · Respiratory and Thorax- negative  · Cardiovascular	negative- See HPI.   · Gastrointestinal- negative  · General Genitourinary Symptoms- BPH  · Musculoskeletal- negative  · Negative Neurological Symptoms- no paresthesias; no headache  · Neurological Symptoms- positive for syncope/  loss of consciousness. Otherwise negative.  · Psychiatric-negative  · Hematology/Lymphatics-negative  · Allergic/Immunologic-negative      ALLERGY:  No Known Allergies      INPATIENT MEDICATIONS:  acetaminophen   Tablet. 650 milliGRAM(s) Oral every 6 hours PRN  aluminum hydroxide/magnesium hydroxide/simethicone Suspension 30 milliLiter(s) Oral every 6 hours PRN  amoxicillin  875 milliGRAM(s)/clavulanate 1 Tablet(s) Oral two times a day  aspirin  chewable 81 milliGRAM(s) Oral daily  heparin  Injectable 5000 Unit(s) SubCutaneous every 8 hours  influenza   Vaccine 0.5 milliLiter(s) IntraMuscular once      VITAL SIGNS:   T(C): 36.3 (03-12-18 @ 09:30), Max: 36.4 (03-11-18 @ 21:25)  HR: 62 (03-12-18 @ 11:25) (60 - 69)  BP: 146/86 (03-12-18 @ 11:25) (132/90 - 148/91)  RR: 16 (03-12-18 @ 11:25) (16 - 16)  SpO2: 98% (03-12-18 @ 11:25) (97% - 99%)      PHYSICAL EXAM:   Appearance: NAD  CVS: S1/S2 normal, RRR, no murmur. PPM in-situ right side  Pulm: CTA bilaterally. No wheeze / rales / rhonchi  Abd:  +BS, Soft, NT/ND	  Ext: No LE edema.  Warm.   Neuro: AAOx 3.     LABS:                        14.2   4.4   )-----------( 229      ( 12 Mar 2018 06:24 )             43.7     03-12    143  |  103  |  18  ----------------------------<  94  3.9   |  28  |  1.32<H>    Ca    8.4      12 Mar 2018 06:24  Mg     2.1     03-12    TSH: 0.264  Troponin: negative x 3     CT head: 3/10/18: negative for ICH.   CXR: 3/10/18: clear lungs.      EKG: 3/101/18: AFIB with  63 bpm.     Telemetry: AFIB,  60s.     ECHO: None available here.
Patient is a 62y old  Male who presents with a chief complaint of syncope (10 Mar 2018 03:35)        HPI:  This is a 63yo M former 75 pack-year smoker, h/o HTN, HLD, Afib with PPM (reportedly secondary to bradycardia) who presents to ED with recurrent syncope.    States he had 2 alcohol drinks, felt well, was on the train, felt sudden onset nausea with vomiting, LOC x 20seconds with "generalized shaking" witnessed by wife.  Pt reports h/o 4 to 5 syncopal episodes over the past 12 months, EEG negative last year.  Was last seen at [facility] 3/1 for c/o flu-like symptoms, treated with tessalon perles, RVP negative. PPM interrogated here 3/1 showed normal function, normal battery, underlying Afib @ 55bpm.  Pt reports he was told to hold daily nifedipine for 3 months but has not done so.  He reports a negative stress test and echo in December; has pending nephrology appointment for possible adrenal cyst, renal cyst and nephrolithiasis.  He denies chest pain, SOB, decreased exercise tolerance or any symptoms to suggest CHF.  He does report poor water intake, attempting to improve.     Recurrent syncope- no headaches or dizziness    In the ED tonight, VSS, EKG paced, CT head negative for acute CVA, c/w left sinusitis treated with Augmentin and 1L IVF.  Labs were notable for elevated Cr of 1.43 c/w prior. K 3.9, Mg 2.3, , Trop <0.01, . (10 Mar 2018 03:35)      Allergies  No Known Allergies      Health Issues  SYNCOPE; UNSPECIFIED SYNCOPE TYPE  No h/o HF  Family history of hypertension (Mother)  Diabetes mellitus, type 2 (Mother)  Coronary artery disease (Mother)  No pertinent family history in first degree relatives  Handoff  MEWS Score  Syncope, unspecified syncope type  Atrial fibrillation  High cholesterol  BPH (benign prostatic hyperplasia)  GERD (gastroesophageal reflux disease)  HTN (hypertension)  No pertinent past medical history  Syncope, unspecified syncope type  Sinusitis  Chronic kidney disease, unspecified CKD stage  High cholesterol  Hypertension, unspecified type  Persistent atrial fibrillation  Syncope, unspecified syncope type  Cardiac pacemaker  SYNCOPE  8        FAMILY HISTORY:  Family history of hypertension (Mother)  Diabetes mellitus, type 2 (Mother)  Coronary artery disease (Mother)      MEDICATIONS  (STANDING):  amoxicillin  875 milliGRAM(s)/clavulanate 1 Tablet(s) Oral two times a day  aspirin  chewable 81 milliGRAM(s) Oral daily  heparin  Injectable 5000 Unit(s) SubCutaneous every 8 hours  influenza   Vaccine 0.5 milliLiter(s) IntraMuscular once    MEDICATIONS  (PRN):  acetaminophen   Tablet. 650 milliGRAM(s) Oral every 6 hours PRN Mild Pain (1 - 3)  aluminum hydroxide/magnesium hydroxide/simethicone Suspension 30 milliLiter(s) Oral every 6 hours PRN Dyspepsia      PAST MEDICAL & SURGICAL HISTORY:  Syncope, unspecified syncope type  Atrial fibrillation  High cholesterol  BPH (benign prostatic hyperplasia)  GERD (gastroesophageal reflux disease)  HTN (hypertension)  Cardiac pacemaker      Labs                          14.2   4.4   )-----------( 229      ( 12 Mar 2018 06:24 )             43.7     03-12    143  |  103  |  18  ----------------------------<  94  3.9   |  28  |  1.32<H>    Ca    8.4      12 Mar 2018 06:24  Mg     2.1     03-12        Radiology:    Physical Exam    MENTAL STATUS  -Level of Consciousness- awake    Orientation- person, place time  Language- aphasia/ dysarthria- nl  Memory- recent and remote- intact      Cranial Nerve 1- 12  Pupils- equal and reactive  Eye movements-full  Facial - no asymmetry   Lower CN-nl    Gait and Station-n/a    MOTOR  Upper-nl  Lower- no foot drop    Reflexes- decreased    Sensation- no sensory level    Cerebellar-no tremors    vascular -no bruits    Assessment- Syncope  Await EEG   Orthostatic BP monitoring    Plan

## 2018-03-12 NOTE — DISCHARGE NOTE ADULT - HOSPITAL COURSE
63yo M former 75 pack-year smoker, h/o HTN, HLD, Afib with PPM (reportedly secondary to bradycardia) who presents to ED with recurrent syncope.  Pt states he had 2 alcohol drinks, was on the train, felt sudden onset nausea with vomiting, LOC x 20seconds with "generalized shaking" witnessed by wife.  Pt reports h/o 4 to 5 syncopal episodes over the past 12 months, EEG negative last year.  Was last seen at [facility] 3/1 for c/o flu-like symptoms, treated with tessalon perles, RVP negative. PPM interrogated here 3/1 showed normal function, normal battery, underlying Afib @ 55bpm.  Pt reports he was told to hold daily Nifedipine for 3 months but has not done so.  He reports a negative stress test and echo in December. Furthermore, pt reports he has pending nephrology appointment for possible adrenal cyst, renal cyst and nephrolithiasis. In the ED tonight, VSS, EKG paced, CT head negative for acute CVA, c/w left sinusitis treated with Augmentin and 1L IVF. Labs were notable for elevated Cr of 1.43 c/w prior. K 3.9, Mg 2.3, , Trop <0.01, . Patient was admitted to [facility] for further syncope work up.     Electrophysiologist Dr. Garza was consulted with recommendation for Tilt table test this morning, but patient is refusing. Patient had recent [employer] PPM interrogation 3/1/2018: with no acute events; persistent Afib; no acute events. Dr. Elizabeth is patient’s Electrophysiologist as an outpatient and has been instructed to follow up with him within one week of discharge. In addition, pt has been recommended for anticoagulation in past and has been prescribed Xarelto (which patient refused) followed by Eliquis (which patient has not taken). Discussed risk benefit of anticoagulation with patient at length and at this time, pt will not agree to NOAC/Coumadin therapy. Echo today revealed (INSERT)    Neurologist Dr. Laura was consulted. CT Head w/o (3/10/2018) was negative for infarct/hemorrhage, Air-fluid level in the left maxillary sinus suggestive of acute sinusitis. Mild prominence of the lateral and third ventricles. Mild microangiopathic disease of the periventricular white matter. Patient wore EEG (3/11/2018) which was negative for seizure like activity. Patient will be discharged on five day course of Augmentin for acute sinusitis. (Remains afebrile with no leukocytosis).     Patient’s lab values have been reviewed;  and pt has recently declined statin therapy, attempting diet/lifestyle modification. TSH LOW but T3/T4 WNL, therefore subclinical. In addition, pt with likely underlying CKD stage 3 which remains stable. Patient has been deemed stable for discharge at this time per Dr. Vu. Patient has been given Dr. Vu’s information and has been instructed to follow up with him in one week for a check up. 63yo M former 75 pack-year smoker, h/o HTN, HLD, Afib with PPM (reportedly secondary to bradycardia) who presents to ED with recurrent syncope.  Pt states he had 2 alcohol drinks, was on the train, felt sudden onset nausea with vomiting, LOC x 20seconds with "generalized shaking" witnessed by wife.  Pt reports h/o 4 to 5 syncopal episodes over the past 12 months, EEG negative last year.  Was last seen at [facility] 3/1 for c/o flu-like symptoms, treated with tessalon perles, RVP negative. PPM interrogated here 3/1 showed normal function, normal battery, underlying Afib @ 55bpm.  Pt reports he was told to hold daily Nifedipine for 3 months but has not done so.  He reports a negative stress test and echo in December. Furthermore, pt reports he has pending nephrology appointment for possible adrenal cyst, renal cyst and nephrolithiasis. In the ED tonight, VSS, EKG paced, CT head negative for acute CVA, c/w left sinusitis treated with Augmentin and 1L IVF. Labs were notable for elevated Cr of 1.43 c/w prior. K 3.9, Mg 2.3, , Trop <0.01, . Patient was admitted to [facility] for further syncope work up.     Electrophysiologist Dr. Garza was consulted with recommendation for Tilt table test this morning, but patient refused. Patient had recent [employer] PPM interrogation 3/1/2018: with no acute events; persistent Afib; no acute events. Dr. Elizabeth is patient’s Electrophysiologist as an outpatient and has been instructed to follow up with him within one week of discharge. In addition, pt has been recommended for anticoagulation in past and has been prescribed Xarelto (which patient refused) followed by Eliquis (which patient has not taken). Discussed risk benefit of anticoagulation with patient at length and at this time, pt will not agree to NOAC/Coumadin therapy.  NST today revealed myocardial perfusion imaging normal, overall LV systolic function normal with paradoxical septal motion secondary to pacing. ECHO with IV contrast revealed _____.  Neurologist Dr. Laura was consulted. CT Head w/o (3/10/2018) was negative for infarct/hemorrhage, Air-fluid level in the left maxillary sinus suggestive of acute sinusitis. Mild prominence of the lateral and third ventricles. Mild microangiopathic disease of the periventricular white matter. Patient wore EEG (3/11/2018) which was negative for seizure like activity. Patient will be discharged on five day course of Augmentin for acute sinusitis. (Remains afebrile with no leukocytosis).  Patient’s lab values have been reviewed;  and pt has recently declined statin therapy, attempting diet/lifestyle modification. TSH LOW but T3/T4 WNL, therefore subclinical. In addition, pt with likely underlying CKD stage 3 which remains stable. K+ 3.9 which was repleted with 20 mEq KCl.  Patient has been deemed stable for discharge at this time per Dr. Vu. Patient has been given Dr. Vu’s information and has been instructed to follow up with him in one week for a check up.  Patient has been given appropriate discharge instructions including medication regimen  and follow up. Medications that patient needs refills on, including augmentin, have been e-prescribed to pt's preferred pharmacy.       Temp: 97.4F, HR 61 BPM, /79, RR 16, SpO2 98% on RA  Gen: NAD, A&O x3  Cards: RRR, clear S1 and S2 without murmur  Pulm: CTA, no w/r/r  Abd: soft, NT  Ext: no LE edema or ulcerations B/L 63yo M former 75 pack-year smoker, h/o HTN, HLD, Afib with PPM (reportedly secondary to bradycardia) who presents to ED with recurrent syncope.  Pt states he had 2 alcohol drinks, was on the train, felt sudden onset nausea with vomiting, LOC x 20seconds with "generalized shaking" witnessed by wife.  Pt reports h/o 4 to 5 syncopal episodes over the past 12 months, EEG negative last year.  Was last seen at [facility] 3/1 for c/o flu-like symptoms, treated with tessalon perles, RVP negative. PPM interrogated here 3/1 showed normal function, normal battery, underlying Afib @ 55bpm.  Pt reports he was told to hold daily Nifedipine for 3 months but has not done so.  He reports a negative stress test and echo in December. Furthermore, pt reports he has pending nephrology appointment for possible adrenal cyst, renal cyst and nephrolithiasis. In the ED tonight, VSS, EKG paced, CT head negative for acute CVA, c/w left sinusitis treated with Augmentin and 1L IVF. Labs were notable for elevated Cr of 1.43 c/w prior. K 3.9, Mg 2.3, , Trop <0.01, . Patient was admitted to [facility] for further syncope work up.     Electrophysiologist Dr. Garza was consulted with recommendation for Tilt table test this morning, but patient refused. Patient had recent [employer] PPM interrogation 3/1/2018: with no acute events; persistent Afib; no acute events. Dr. Elizabeth is patient’s Electrophysiologist as an outpatient and has been instructed to follow up with him within one week of discharge. In addition, pt has been recommended for anticoagulation in past and has been prescribed Xarelto (which patient refused) followed by Eliquis (which patient has not taken). Discussed risk benefit of anticoagulation with patient at length and at this time, pt will not agree to NOAC/Coumadin therapy.  NST today revealed myocardial perfusion imaging normal, overall LV systolic function normal with paradoxical septal motion secondary to pacing. ECHO with IV contrast revealed abnormal (paradoxical) septal motion c/w abnormal conduction, hypokinesis of all apical segments, LVEF mildly reduced @ 45-50%, no LV thrombus seen.   .  Neurologist Dr. Laura was consulted. CT Head w/o (3/10/2018) was negative for infarct/hemorrhage, Air-fluid level in the left maxillary sinus suggestive of acute sinusitis. Mild prominence of the lateral and third ventricles. Mild microangiopathic disease of the periventricular white matter. Patient wore EEG (3/11/2018) which was negative for seizure like activity. Patient will be discharged on five day course of Augmentin for acute sinusitis. (Remains afebrile with no leukocytosis).  Patient’s lab values have been reviewed;  and pt has recently declined statin therapy, attempting diet/lifestyle modification. TSH LOW but T3/T4 WNL, therefore subclinical. In addition, pt with likely underlying CKD stage 3 which remains stable. K+ 3.9 which was repleted with 20 mEq KCl.  Patient has been deemed stable for discharge at this time per Dr. Vu. Patient has been given Dr. Vu’s information and has been instructed to follow up with him in one week for a check up.  Patient has been given appropriate discharge instructions including medication regimen  and follow up. Medications that patient needs refills on, including augmentin, have been e-prescribed to pt's preferred pharmacy.       Temp: 97.4F, HR 61 BPM, /79, RR 16, SpO2 98% on RA  Gen: NAD, A&O x3  Cards: RRR, clear S1 and S2 without murmur  Pulm: CTA, no w/r/r  Abd: soft, NT  Ext: no LE edema or ulcerations B/L

## 2018-03-13 VITALS — HEART RATE: 62 BPM | DIASTOLIC BLOOD PRESSURE: 87 MMHG | RESPIRATION RATE: 18 BRPM | SYSTOLIC BLOOD PRESSURE: 147 MMHG

## 2018-03-13 LAB
ANION GAP SERPL CALC-SCNC: 11 MMOL/L — SIGNIFICANT CHANGE UP (ref 5–17)
BUN SERPL-MCNC: 18 MG/DL — SIGNIFICANT CHANGE UP (ref 7–23)
CALCIUM SERPL-MCNC: 8.7 MG/DL — SIGNIFICANT CHANGE UP (ref 8.4–10.5)
CHLORIDE SERPL-SCNC: 102 MMOL/L — SIGNIFICANT CHANGE UP (ref 96–108)
CO2 SERPL-SCNC: 26 MMOL/L — SIGNIFICANT CHANGE UP (ref 22–31)
CREAT SERPL-MCNC: 1.24 MG/DL — SIGNIFICANT CHANGE UP (ref 0.5–1.3)
GLUCOSE SERPL-MCNC: 97 MG/DL — SIGNIFICANT CHANGE UP (ref 70–99)
HCT VFR BLD CALC: 44 % — SIGNIFICANT CHANGE UP (ref 39–50)
HGB BLD-MCNC: 14.6 G/DL — SIGNIFICANT CHANGE UP (ref 13–17)
MAGNESIUM SERPL-MCNC: 2.2 MG/DL — SIGNIFICANT CHANGE UP (ref 1.6–2.6)
MCHC RBC-ENTMCNC: 31.8 PG — SIGNIFICANT CHANGE UP (ref 27–34)
MCHC RBC-ENTMCNC: 33.2 G/DL — SIGNIFICANT CHANGE UP (ref 32–36)
MCV RBC AUTO: 95.9 FL — SIGNIFICANT CHANGE UP (ref 80–100)
PLATELET # BLD AUTO: 243 K/UL — SIGNIFICANT CHANGE UP (ref 150–400)
POTASSIUM SERPL-MCNC: 3.9 MMOL/L — SIGNIFICANT CHANGE UP (ref 3.5–5.3)
POTASSIUM SERPL-SCNC: 3.9 MMOL/L — SIGNIFICANT CHANGE UP (ref 3.5–5.3)
RBC # BLD: 4.59 M/UL — SIGNIFICANT CHANGE UP (ref 4.2–5.8)
RBC # FLD: 13 % — SIGNIFICANT CHANGE UP (ref 10.3–16.9)
SODIUM SERPL-SCNC: 139 MMOL/L — SIGNIFICANT CHANGE UP (ref 135–145)
WBC # BLD: 4.2 K/UL — SIGNIFICANT CHANGE UP (ref 3.8–10.5)
WBC # FLD AUTO: 4.2 K/UL — SIGNIFICANT CHANGE UP (ref 3.8–10.5)

## 2018-03-13 PROCEDURE — 80053 COMPREHEN METABOLIC PANEL: CPT

## 2018-03-13 PROCEDURE — 85610 PROTHROMBIN TIME: CPT

## 2018-03-13 PROCEDURE — 93005 ELECTROCARDIOGRAM TRACING: CPT

## 2018-03-13 PROCEDURE — 93306 TTE W/DOPPLER COMPLETE: CPT

## 2018-03-13 PROCEDURE — 84100 ASSAY OF PHOSPHORUS: CPT

## 2018-03-13 PROCEDURE — 80307 DRUG TEST PRSMV CHEM ANLYZR: CPT

## 2018-03-13 PROCEDURE — 80048 BASIC METABOLIC PNL TOTAL CA: CPT

## 2018-03-13 PROCEDURE — 84439 ASSAY OF FREE THYROXINE: CPT

## 2018-03-13 PROCEDURE — 85025 COMPLETE CBC W/AUTO DIFF WBC: CPT

## 2018-03-13 PROCEDURE — 95951: CPT

## 2018-03-13 PROCEDURE — A9505: CPT

## 2018-03-13 PROCEDURE — 83880 ASSAY OF NATRIURETIC PEPTIDE: CPT

## 2018-03-13 PROCEDURE — 81003 URINALYSIS AUTO W/O SCOPE: CPT

## 2018-03-13 PROCEDURE — 82553 CREATINE MB FRACTION: CPT

## 2018-03-13 PROCEDURE — 84443 ASSAY THYROID STIM HORMONE: CPT

## 2018-03-13 PROCEDURE — 85730 THROMBOPLASTIN TIME PARTIAL: CPT

## 2018-03-13 PROCEDURE — 85027 COMPLETE CBC AUTOMATED: CPT

## 2018-03-13 PROCEDURE — 93017 CV STRESS TEST TRACING ONLY: CPT

## 2018-03-13 PROCEDURE — C8929: CPT

## 2018-03-13 PROCEDURE — 84481 FREE ASSAY (FT-3): CPT

## 2018-03-13 PROCEDURE — 80061 LIPID PANEL: CPT

## 2018-03-13 PROCEDURE — 78452 HT MUSCLE IMAGE SPECT MULT: CPT

## 2018-03-13 PROCEDURE — 99285 EMERGENCY DEPT VISIT HI MDM: CPT | Mod: 25

## 2018-03-13 PROCEDURE — A9500: CPT

## 2018-03-13 PROCEDURE — 83735 ASSAY OF MAGNESIUM: CPT

## 2018-03-13 PROCEDURE — 93306 TTE W/DOPPLER COMPLETE: CPT | Mod: 26

## 2018-03-13 PROCEDURE — 70450 CT HEAD/BRAIN W/O DYE: CPT

## 2018-03-13 PROCEDURE — 36415 COLL VENOUS BLD VENIPUNCTURE: CPT

## 2018-03-13 PROCEDURE — 82550 ASSAY OF CK (CPK): CPT

## 2018-03-13 PROCEDURE — 84484 ASSAY OF TROPONIN QUANT: CPT

## 2018-03-13 RX ORDER — POTASSIUM CHLORIDE 20 MEQ
20 PACKET (EA) ORAL ONCE
Qty: 0 | Refills: 0 | Status: COMPLETED | OUTPATIENT
Start: 2018-03-13 | End: 2018-03-13

## 2018-03-13 RX ORDER — NIFEDIPINE 30 MG
1 TABLET, EXTENDED RELEASE 24 HR ORAL
Qty: 0 | Refills: 0 | COMMUNITY

## 2018-03-13 RX ORDER — NIFEDIPINE 30 MG
1 TABLET, EXTENDED RELEASE 24 HR ORAL
Qty: 30 | Refills: 2 | OUTPATIENT
Start: 2018-03-13 | End: 2018-06-10

## 2018-03-13 RX ADMIN — HEPARIN SODIUM 5000 UNIT(S): 5000 INJECTION INTRAVENOUS; SUBCUTANEOUS at 06:14

## 2018-03-13 RX ADMIN — Medication 20 MILLIEQUIVALENT(S): at 07:51

## 2018-03-13 RX ADMIN — Medication 81 MILLIGRAM(S): at 14:08

## 2018-03-13 RX ADMIN — Medication 1 TABLET(S): at 18:18

## 2018-03-13 RX ADMIN — Medication 1 TABLET(S): at 06:14

## 2018-03-13 RX ADMIN — HEPARIN SODIUM 5000 UNIT(S): 5000 INJECTION INTRAVENOUS; SUBCUTANEOUS at 14:08

## 2018-03-13 NOTE — PROGRESS NOTE ADULT - SUBJECTIVE AND OBJECTIVE BOX
Pt is stable    but echo defines cardiomyopathy     PAST MEDICAL & SURGICAL HISTORY:  Syncope, unspecified syncope type  Atrial fibrillation  High cholesterol  BPH (benign prostatic hyperplasia)  GERD (gastroesophageal reflux disease)  HTN (hypertension)  Cardiac pacemaker    MEDICATIONS  (STANDING):  amoxicillin  875 milliGRAM(s)/clavulanate 1 Tablet(s) Oral two times a day  aspirin  chewable 81 milliGRAM(s) Oral daily  heparin  Injectable 5000 Unit(s) SubCutaneous every 8 hours  influenza   Vaccine 0.5 milliLiter(s) IntraMuscular once    MEDICATIONS  (PRN):  acetaminophen   Tablet. 650 milliGRAM(s) Oral every 6 hours PRN Mild Pain (1 - 3)  aluminum hydroxide/magnesium hydroxide/simethicone Suspension 30 milliLiter(s) Oral every 6 hours PRN Dyspepsia    ICU Vital Signs Last 24 Hrs  T(C): 36.2 (13 Mar 2018 09:45), Max: 36.3 (13 Mar 2018 06:00)  T(F): 97.1 (13 Mar 2018 09:45), Max: 97.4 (13 Mar 2018 06:00)  HR: 64 (13 Mar 2018 09:46) (61 - 65)  BP: 147/93 (13 Mar 2018 09:46) (125/79 - 154/90)  BP(mean): --  ABP: --  ABP(mean): --  RR: 18 (13 Mar 2018 09:46) (16 - 18)  SpO2: 98% (13 Mar 2018 09:46) (95% - 98%)    Lungs clear    CXR Clear  Lungs  CV S1 S2     ECHO   DILATED ATRIA     TRACE TR  MILD MR   DILATED RV   PPM IN R HEART   EF 45-50 % APICAL INFERIOR AND APICAL SEPTAL APICAL LATERAL WALL HYPOKINESIS     CXR neg                            14.6   4.2   )-----------( 243      ( 13 Mar 2018 06:08 )             44.0   03-13    139  |  102  |  18  ----------------------------<  97  3.9   |  26  |  1.24    Ca    8.7      13 Mar 2018 06:08  Mg     2.2     03-13

## 2018-03-13 NOTE — PROGRESS NOTE ADULT - SUBJECTIVE AND OBJECTIVE BOX
Neurology Follow up note    Name  ANDREY ORO    HPI:  This is a 63yo M former 75 pack-year smoker, h/o HTN, HLD, Afib with PPM (reportedly secondary to bradycardia) who presents to ED with recurrent syncope.    States he had 2 alcohol drinks, felt well, was on the train, felt sudden onset nausea with vomiting, LOC x 20seconds with "generalized shaking" witnessed by wife.  Pt reports h/o 4 to 5 syncopal episodes over the past 12 months, EEG negative last year.  Was last seen at [facility] 3/1 for c/o flu-like symptoms, treated with tessalon perles, RVP negative. PPM interrogated here 3/1 showed normal function, normal battery, underlying Afib @ 55bpm.  Pt reports he was told to hold daily nifedipine for 3 months but has not done so.  He reports a negative stress test and echo in December; has pending nephrology appointment for possible adrenal cyst, renal cyst and nephrolithiasis.  He denies chest pain, SOB, decreased exercise tolerance or any symptoms to suggest CHF.  He does report poor water intake, attempting to improve.    In the ED tonight, VSS, EKG paced, CT head negative for acute CVA, c/w left sinusitis treated with Augmentin and 1L IVF.  Labs were notable for elevated Cr of 1.43 c/w prior. K 3.9, Mg 2.3, , Trop <0.01, . (10 Mar 2018 03:35)      Interval History - no furtjer syncopal episodes- no pain        REVIEW OF SYSTEMS    Vital Signs Last 24 Hrs  T(C): 36.3 (13 Mar 2018 06:00), Max: 36.3 (12 Mar 2018 09:30)  T(F): 97.4 (13 Mar 2018 06:00), Max: 97.4 (13 Mar 2018 06:00)  HR: 61 (13 Mar 2018 06:11) (61 - 69)  BP: 125/79 (13 Mar 2018 06:11) (125/79 - 154/90)  BP(mean): --  RR: 16 (13 Mar 2018 06:11) (16 - 16)  SpO2: 98% (13 Mar 2018 06:11) (95% - 98%)    Physical Exam-     Mental Status- awake and alert    Cranial Nerves-full EOM    Gait and station- n/a    Motor- no foot drop    Reflexes- decreased    Sensation-no sensory level    Coordination-no tremors    Vascular -no bruits    Medications  acetaminophen   Tablet. 650 milliGRAM(s) Oral every 6 hours PRN  aluminum hydroxide/magnesium hydroxide/simethicone Suspension 30 milliLiter(s) Oral every 6 hours PRN  amoxicillin  875 milliGRAM(s)/clavulanate 1 Tablet(s) Oral two times a day  aspirin  chewable 81 milliGRAM(s) Oral daily  heparin  Injectable 5000 Unit(s) SubCutaneous every 8 hours  influenza   Vaccine 0.5 milliLiter(s) IntraMuscular once      Lab      Radiology    Assessment- Syncope    Plan- as per Dr Vu

## 2018-03-16 DIAGNOSIS — E78.5 HYPERLIPIDEMIA, UNSPECIFIED: ICD-10-CM

## 2018-03-16 DIAGNOSIS — I12.9 HYPERTENSIVE CHRONIC KIDNEY DISEASE WITH STAGE 1 THROUGH STAGE 4 CHRONIC KIDNEY DISEASE, OR UNSPECIFIED CHRONIC KIDNEY DISEASE: ICD-10-CM

## 2018-03-16 DIAGNOSIS — J01.90 ACUTE SINUSITIS, UNSPECIFIED: ICD-10-CM

## 2018-03-16 DIAGNOSIS — R55 SYNCOPE AND COLLAPSE: ICD-10-CM

## 2018-03-16 DIAGNOSIS — Z95.0 PRESENCE OF CARDIAC PACEMAKER: ICD-10-CM

## 2018-03-16 DIAGNOSIS — K21.9 GASTRO-ESOPHAGEAL REFLUX DISEASE WITHOUT ESOPHAGITIS: ICD-10-CM

## 2018-03-16 DIAGNOSIS — Z87.891 PERSONAL HISTORY OF NICOTINE DEPENDENCE: ICD-10-CM

## 2018-03-16 DIAGNOSIS — N18.3 CHRONIC KIDNEY DISEASE, STAGE 3 (MODERATE): ICD-10-CM

## 2018-03-16 DIAGNOSIS — I48.1 PERSISTENT ATRIAL FIBRILLATION: ICD-10-CM

## 2018-03-27 PROBLEM — I48.91 UNSPECIFIED ATRIAL FIBRILLATION: Chronic | Status: ACTIVE | Noted: 2018-03-10

## 2018-03-27 PROBLEM — R55 SYNCOPE AND COLLAPSE: Chronic | Status: ACTIVE | Noted: 2018-03-10

## 2018-04-04 ENCOUNTER — APPOINTMENT (OUTPATIENT)
Dept: HEART AND VASCULAR | Facility: CLINIC | Age: 63
End: 2018-04-04
Payer: COMMERCIAL

## 2018-04-04 VITALS
WEIGHT: 250 LBS | DIASTOLIC BLOOD PRESSURE: 84 MMHG | HEIGHT: 74 IN | BODY MASS INDEX: 32.08 KG/M2 | SYSTOLIC BLOOD PRESSURE: 129 MMHG | HEART RATE: 69 BPM

## 2018-04-04 DIAGNOSIS — Z87.898 PERSONAL HISTORY OF OTHER SPECIFIED CONDITIONS: ICD-10-CM

## 2018-04-04 PROCEDURE — 99215 OFFICE O/P EST HI 40 MIN: CPT | Mod: 25

## 2018-04-04 PROCEDURE — 93280 PM DEVICE PROGR EVAL DUAL: CPT

## 2018-08-12 ENCOUNTER — EMERGENCY (EMERGENCY)
Facility: HOSPITAL | Age: 63
LOS: 1 days | Discharge: HOME | End: 2018-08-12
Attending: EMERGENCY MEDICINE | Admitting: EMERGENCY MEDICINE
Payer: COMMERCIAL

## 2018-08-12 VITALS
DIASTOLIC BLOOD PRESSURE: 82 MMHG | HEART RATE: 69 BPM | TEMPERATURE: 98 F | OXYGEN SATURATION: 96 % | RESPIRATION RATE: 18 BRPM | SYSTOLIC BLOOD PRESSURE: 129 MMHG | WEIGHT: 248.68 LBS

## 2018-08-12 VITALS
DIASTOLIC BLOOD PRESSURE: 75 MMHG | SYSTOLIC BLOOD PRESSURE: 120 MMHG | HEART RATE: 59 BPM | OXYGEN SATURATION: 100 % | TEMPERATURE: 98 F | RESPIRATION RATE: 18 BRPM

## 2018-08-12 DIAGNOSIS — Z95.0 PRESENCE OF CARDIAC PACEMAKER: Chronic | ICD-10-CM

## 2018-08-12 PROBLEM — E78.00 PURE HYPERCHOLESTEROLEMIA, UNSPECIFIED: Chronic | Status: ACTIVE | Noted: 2017-07-22

## 2018-08-12 PROBLEM — I10 ESSENTIAL (PRIMARY) HYPERTENSION: Chronic | Status: ACTIVE | Noted: 2017-03-05

## 2018-08-12 PROBLEM — K21.9 GASTRO-ESOPHAGEAL REFLUX DISEASE WITHOUT ESOPHAGITIS: Chronic | Status: ACTIVE | Noted: 2017-03-05

## 2018-08-12 PROBLEM — N40.0 BENIGN PROSTATIC HYPERPLASIA WITHOUT LOWER URINARY TRACT SYMPTOMS: Chronic | Status: ACTIVE | Noted: 2017-07-22

## 2018-08-12 PROCEDURE — 93005 ELECTROCARDIOGRAM TRACING: CPT

## 2018-08-12 PROCEDURE — 99283 EMERGENCY DEPT VISIT LOW MDM: CPT | Mod: 25

## 2018-08-12 PROCEDURE — 99285 EMERGENCY DEPT VISIT HI MDM: CPT | Mod: 25

## 2018-08-12 PROCEDURE — 71046 X-RAY EXAM CHEST 2 VIEWS: CPT | Mod: 26

## 2018-08-12 PROCEDURE — 93010 ELECTROCARDIOGRAM REPORT: CPT

## 2018-08-12 PROCEDURE — 94640 AIRWAY INHALATION TREATMENT: CPT

## 2018-08-12 PROCEDURE — 71046 X-RAY EXAM CHEST 2 VIEWS: CPT

## 2018-08-12 RX ORDER — ALBUTEROL 90 UG/1
2 AEROSOL, METERED ORAL
Qty: 1 | Refills: 0 | OUTPATIENT
Start: 2018-08-12

## 2018-08-12 RX ORDER — ALBUTEROL 90 UG/1
2.5 AEROSOL, METERED ORAL ONCE
Qty: 0 | Refills: 0 | Status: COMPLETED | OUTPATIENT
Start: 2018-08-12 | End: 2018-08-12

## 2018-08-12 RX ORDER — ALBUTEROL 90 UG/1
2 AEROSOL, METERED ORAL
Qty: 1 | Refills: 0 | COMMUNITY
Start: 2018-08-12

## 2018-08-12 RX ADMIN — ALBUTEROL 2.5 MILLIGRAM(S): 90 AEROSOL, METERED ORAL at 12:42

## 2018-08-12 NOTE — ED ADULT TRIAGE NOTE - CHIEF COMPLAINT QUOTE
" I have a chest congestion for the past month and it doesn't seem to go away. I have been coughing a lot

## 2018-08-12 NOTE — ED ADULT NURSE NOTE - NSIMPLEMENTINTERV_GEN_ALL_ED
Implemented All Universal Safety Interventions:  [place] to call system. Call bell, personal items and telephone within reach. Instruct patient to call for assistance. Room bathroom lighting operational. Non-slip footwear when patient is off stretcher. Physically safe environment: no spills, clutter or unnecessary equipment. Stretcher in lowest position, wheels locked, appropriate side rails in place.

## 2018-08-12 NOTE — ED PROVIDER NOTE - OBJECTIVE STATEMENT
Pt is a 61yo , h/o htn, hld, afib, pm for bradycardia, gerd, who p/w cough/ congestion x 4 wks. Began as sinus infection w/ post nasal drip/ sorethroat, took 10d course of amox with minimal improvement. Pt then started on cefuroxime  and finished course 2d ago with continued congestion. No fever/ chills. No sob. + chest "raw" to b/l sides when coughing. No abd pain, n/v/d. No leg edema. Seen by ent 2d ago and started on flonase yesterday.

## 2018-08-12 NOTE — ED PROVIDER NOTE - DIAGNOSTIC INTERPRETATION
ER Physician: June Ree  CHEST XRAY INTERPRETATION: small left pleural effusion, pm, heart shadow normal, bony structures intact ER Physician: June Ree  CHEST XRAY INTERPRETATION: small left pleural effusion, pm, heart shadow normal, bony structures intact, unchanged from prior cxr.

## 2018-08-12 NOTE — ED PROVIDER NOTE - PHYSICAL EXAMINATION
VITAL SIGNS: I have reviewed nursing notes and confirm.  CONSTITUTIONAL: Well-developed; well-nourished; in no acute distress.   SKIN:  warm and dry, no acute rash.   HEAD:  normocephalic, atraumatic.  EYES: PERRL, EOM intact; conjunctiva and sclera clear.  ENT: No nasal discharge; airway clear.   NECK: Supple; non tender.  CARD: S1, S2 normal; no murmurs, gallops, or rubs. Regular rate and rhythm.   RESP:  + exp wheezing b/l, no rales/ rhonchi.  ABD: Normal bowel sounds; soft; non-distended; non-tender; no guarding/ rebound.  EXT: Normal ROM. No clubbing, cyanosis or edema. 2+ pulses to b/l ue/le.  NEURO: Alert, oriented, grossly unremarkable  PSYCH: Cooperative, mood and affect appropriate.

## 2018-08-12 NOTE — ED PROVIDER NOTE - MEDICAL DECISION MAKING DETAILS
Impression: persistent cough x 4 wks, tx'd w/ 2 rounds of abx with no improvement. Afebrile, hds. EKG showing paced rhythm. CXR neg for infiltrate, chf, + small left pleural effusion; findings unchanged from cxr from 3/18. Pt tx'd w/ alb neb with improvement. Test results d/w pt. Will dc home w/ rx for alb inhaler and pt to f/u with pcp for re-evaluation. Pt given return precautions and is understanding of discharge plan of care.

## 2018-08-12 NOTE — ED ADULT NURSE NOTE - OBJECTIVE STATEMENT
pt received in room 6 A & o x 3 pt c/o chest and nasal congestion for 10 d , pt was treated for a course of antibiotics , pt states " im still coughing and I want to make sure it doesn't get worse and turns into a pneumonia " will continue to monitor

## 2018-08-16 DIAGNOSIS — Z79.82 LONG TERM (CURRENT) USE OF ASPIRIN: ICD-10-CM

## 2018-08-16 DIAGNOSIS — Z79.2 LONG TERM (CURRENT) USE OF ANTIBIOTICS: ICD-10-CM

## 2018-08-16 DIAGNOSIS — Z79.899 OTHER LONG TERM (CURRENT) DRUG THERAPY: ICD-10-CM

## 2018-08-16 DIAGNOSIS — E78.5 HYPERLIPIDEMIA, UNSPECIFIED: ICD-10-CM

## 2018-08-16 DIAGNOSIS — R05 COUGH: ICD-10-CM

## 2018-08-16 DIAGNOSIS — J40 BRONCHITIS, NOT SPECIFIED AS ACUTE OR CHRONIC: ICD-10-CM

## 2018-08-16 DIAGNOSIS — I10 ESSENTIAL (PRIMARY) HYPERTENSION: ICD-10-CM

## 2018-08-16 DIAGNOSIS — Z87.891 PERSONAL HISTORY OF NICOTINE DEPENDENCE: ICD-10-CM

## 2018-08-18 ENCOUNTER — INPATIENT (INPATIENT)
Facility: HOSPITAL | Age: 63
LOS: 0 days | Discharge: HOME | DRG: 176 | End: 2018-08-19
Attending: HOSPITALIST | Admitting: HOSPITALIST
Payer: COMMERCIAL

## 2018-08-18 VITALS
HEIGHT: 74.5 IN | WEIGHT: 244.93 LBS | HEART RATE: 64 BPM | SYSTOLIC BLOOD PRESSURE: 112 MMHG | TEMPERATURE: 98 F | RESPIRATION RATE: 18 BRPM | OXYGEN SATURATION: 97 % | DIASTOLIC BLOOD PRESSURE: 68 MMHG

## 2018-08-18 DIAGNOSIS — Z95.0 PRESENCE OF CARDIAC PACEMAKER: Chronic | ICD-10-CM

## 2018-08-18 LAB
ALBUMIN SERPL ELPH-MCNC: 4.1 G/DL — SIGNIFICANT CHANGE UP (ref 3.3–5)
ALP SERPL-CCNC: 91 U/L — SIGNIFICANT CHANGE UP (ref 40–120)
ALT FLD-CCNC: 23 U/L — SIGNIFICANT CHANGE UP (ref 10–45)
ANION GAP SERPL CALC-SCNC: 19 MMOL/L — HIGH (ref 5–17)
APTT BLD: 27.1 SEC — LOW (ref 27.5–37.4)
AST SERPL-CCNC: 23 U/L — SIGNIFICANT CHANGE UP (ref 10–40)
BILIRUB SERPL-MCNC: 0.4 MG/DL — SIGNIFICANT CHANGE UP (ref 0.2–1.2)
BUN SERPL-MCNC: 18 MG/DL — SIGNIFICANT CHANGE UP (ref 7–23)
CALCIUM SERPL-MCNC: 8.9 MG/DL — SIGNIFICANT CHANGE UP (ref 8.4–10.5)
CHLORIDE SERPL-SCNC: 103 MMOL/L — SIGNIFICANT CHANGE UP (ref 96–108)
CK MB CFR SERPL CALC: 4.5 NG/ML — SIGNIFICANT CHANGE UP (ref 0–6.7)
CK SERPL-CCNC: 552 U/L — HIGH (ref 30–200)
CO2 SERPL-SCNC: 20 MMOL/L — LOW (ref 22–31)
CREAT SERPL-MCNC: 1.68 MG/DL — HIGH (ref 0.5–1.3)
EOSINOPHIL NFR BLD AUTO: 1 % — SIGNIFICANT CHANGE UP (ref 0–6)
GLUCOSE SERPL-MCNC: 106 MG/DL — HIGH (ref 70–99)
HCT VFR BLD CALC: 43.4 % — SIGNIFICANT CHANGE UP (ref 39–50)
HGB BLD-MCNC: 14.2 G/DL — SIGNIFICANT CHANGE UP (ref 13–17)
INR BLD: 1.09 — SIGNIFICANT CHANGE UP (ref 0.88–1.16)
LYMPHOCYTES # BLD AUTO: 41.1 % — SIGNIFICANT CHANGE UP (ref 13–44)
MCHC RBC-ENTMCNC: 31.2 PG — SIGNIFICANT CHANGE UP (ref 27–34)
MCHC RBC-ENTMCNC: 32.7 G/DL — SIGNIFICANT CHANGE UP (ref 32–36)
MCV RBC AUTO: 95.4 FL — SIGNIFICANT CHANGE UP (ref 80–100)
MONOCYTES NFR BLD AUTO: 11.3 % — SIGNIFICANT CHANGE UP (ref 2–14)
NEUTROPHILS NFR BLD AUTO: 46.6 % — SIGNIFICANT CHANGE UP (ref 43–77)
NT-PROBNP SERPL-SCNC: 707 PG/ML — HIGH (ref 0–300)
PLATELET # BLD AUTO: 206 K/UL — SIGNIFICANT CHANGE UP (ref 150–400)
POTASSIUM SERPL-MCNC: 3.8 MMOL/L — SIGNIFICANT CHANGE UP (ref 3.5–5.3)
POTASSIUM SERPL-SCNC: 3.8 MMOL/L — SIGNIFICANT CHANGE UP (ref 3.5–5.3)
PROT SERPL-MCNC: 8.1 G/DL — SIGNIFICANT CHANGE UP (ref 6–8.3)
PROTHROM AB SERPL-ACNC: 12.1 SEC — SIGNIFICANT CHANGE UP (ref 9.8–12.7)
RBC # BLD: 4.55 M/UL — SIGNIFICANT CHANGE UP (ref 4.2–5.8)
RBC # FLD: 13.1 % — SIGNIFICANT CHANGE UP (ref 10.3–16.9)
SODIUM SERPL-SCNC: 142 MMOL/L — SIGNIFICANT CHANGE UP (ref 135–145)
TROPONIN T SERPL-MCNC: <0.01 NG/ML — SIGNIFICANT CHANGE UP (ref 0–0.01)
WBC # BLD: 2.9 K/UL — LOW (ref 3.8–10.5)
WBC # FLD AUTO: 2.9 K/UL — LOW (ref 3.8–10.5)

## 2018-08-18 PROCEDURE — 99223 1ST HOSP IP/OBS HIGH 75: CPT | Mod: GC

## 2018-08-18 PROCEDURE — 71046 X-RAY EXAM CHEST 2 VIEWS: CPT | Mod: 26

## 2018-08-18 PROCEDURE — 71275 CT ANGIOGRAPHY CHEST: CPT | Mod: 26

## 2018-08-18 PROCEDURE — 99291 CRITICAL CARE FIRST HOUR: CPT

## 2018-08-18 RX ORDER — HEPARIN SODIUM 5000 [USP'U]/ML
4500 INJECTION INTRAVENOUS; SUBCUTANEOUS EVERY 6 HOURS
Qty: 0 | Refills: 0 | Status: DISCONTINUED | OUTPATIENT
Start: 2018-08-18 | End: 2018-08-19

## 2018-08-18 RX ORDER — SODIUM CHLORIDE 9 MG/ML
1000 INJECTION INTRAMUSCULAR; INTRAVENOUS; SUBCUTANEOUS ONCE
Qty: 0 | Refills: 0 | Status: COMPLETED | OUTPATIENT
Start: 2018-08-18 | End: 2018-08-18

## 2018-08-18 RX ORDER — HEPARIN SODIUM 5000 [USP'U]/ML
INJECTION INTRAVENOUS; SUBCUTANEOUS
Qty: 25000 | Refills: 0 | Status: DISCONTINUED | OUTPATIENT
Start: 2018-08-18 | End: 2018-08-19

## 2018-08-18 RX ORDER — HEPARIN SODIUM 5000 [USP'U]/ML
9000 INJECTION INTRAVENOUS; SUBCUTANEOUS EVERY 6 HOURS
Qty: 0 | Refills: 0 | Status: DISCONTINUED | OUTPATIENT
Start: 2018-08-18 | End: 2018-08-19

## 2018-08-18 RX ORDER — IPRATROPIUM/ALBUTEROL SULFATE 18-103MCG
3 AEROSOL WITH ADAPTER (GRAM) INHALATION ONCE
Qty: 0 | Refills: 0 | Status: COMPLETED | OUTPATIENT
Start: 2018-08-18 | End: 2018-08-18

## 2018-08-18 RX ORDER — HEPARIN SODIUM 5000 [USP'U]/ML
9000 INJECTION INTRAVENOUS; SUBCUTANEOUS ONCE
Qty: 0 | Refills: 0 | Status: COMPLETED | OUTPATIENT
Start: 2018-08-18 | End: 2018-08-18

## 2018-08-18 RX ADMIN — HEPARIN SODIUM 2000 UNIT(S)/HR: 5000 INJECTION INTRAVENOUS; SUBCUTANEOUS at 21:17

## 2018-08-18 RX ADMIN — Medication 3 MILLILITER(S): at 17:42

## 2018-08-18 RX ADMIN — HEPARIN SODIUM 9000 UNIT(S): 5000 INJECTION INTRAVENOUS; SUBCUTANEOUS at 21:22

## 2018-08-18 RX ADMIN — SODIUM CHLORIDE 1000 MILLILITER(S): 9 INJECTION INTRAMUSCULAR; INTRAVENOUS; SUBCUTANEOUS at 21:01

## 2018-08-18 NOTE — ED PROVIDER NOTE - MEDICAL DECISION MAKING DETAILS
patient in ED w concern for ongoing SOB.  Labs and imaging completed and reviewed.  CTA chest completed as patient w recent ED eval for similar symptoms and otherwise unremarkable work up, though CTA not completed at that time.  CTA chest with evidence of multiple bilateral segmental PEs.  Patient aware and agreeable to admission with heparinization.  Given patient's stable vital signs, no MICU consult is called and pt to be admitted to medicine service, [facility].

## 2018-08-18 NOTE — ED ADULT NURSE NOTE - CAS EDN DISCHARGE ASSESSMENT
Awake/No adverse reaction to first time med in ED/Alert and oriented to person, place and time/Patient baseline mental status

## 2018-08-18 NOTE — ED ADULT NURSE NOTE - OTHER COMPLAINTS
Pt also C/O dizziness. As per pt, onset of symptom was 14:30PM. Pt denies chest pain. Pt is A&O x3, able to speak coherently in full sentences, no facial droop, no arm drift. Hx of a fib, HTN, pacemaker. EKG in progress.

## 2018-08-18 NOTE — ED PROVIDER NOTE - OBJECTIVE STATEMENT
62 year old male presents to ED with concern for SOB over the past several weeks.  Patient notes symptoms do not seem to be worsening, though he does not feel they're improving.  He notes associated cough productive of sputum, and nasal congestion.  Patient denies associated chest pain, fever, chills, abdominal pain, nausea, vomiting, peripheral edema or any additional acute complaints or concerns at this time.

## 2018-08-18 NOTE — ED ADULT NURSE NOTE - NSIMPLEMENTINTERV_GEN_ALL_ED
Implemented All Universal Safety Interventions:  [place] to call system. Call bell, personal items and telephone within reach. Instruct patient to call for assistance. Room bathroom lighting operational. Non-slip footwear when patient is off stretcher. Physically safe environment: no spills, clutter or unnecessary equipment. Stretcher in lowest position, wheels locked, appropriate side rails in place.

## 2018-08-18 NOTE — ED ADULT NURSE NOTE - OBJECTIVE STATEMENT
63 y/o male complaining of weakness x1 month, SOB and dizziness, increased weakness since 1430 today. MD notified, #[id number] placed RAC labs sent, EKG obtained and patient placed on monitor. Normotensive. Pmhx afib and htn. Pt states he just does not feel himself. Pt was admitted to [facility] in March for syncopal episodes. No recent hx of falls or syncope. Pt appears well, no work with breathing/dyspnea/tachypnea, 99% SpO2 on room air, talking in full sentences, no focal weakness,  strength good. Able to ambulate without difficulty.

## 2018-08-18 NOTE — ED ADULT TRIAGE NOTE - OTHER COMPLAINTS
Pt also C/O dizziness. Pt denies chest pain. Pt is A&O x3, able to speak coherently in full sentences, no facial droop, no arm drift. Hx of a fib, HTN, pacemaker. EKG in progress. Pt also C/O dizziness. As per pt, onset of symptom was 14:30PM. Pt denies chest pain. Pt is A&O x3, able to speak coherently in full sentences, no facial droop, no arm drift. Hx of a fib, HTN, pacemaker. EKG in progress.

## 2018-08-18 NOTE — ED PROVIDER NOTE - NS ED MD DISPO SPECIAL CONSIDERATION1
Progress Note    Patient: Parvez De Dios Date: 3/30/2017   male, 59 year old  Admit Date: 3/29/2017   Attending: Josias Mandel MD      Subjective:  Parvez De Dios is a 59 year old year old male who is being seen in follow up for chest pain    No further chest pain. No orthopnea, dyspnea. No abdominal pain, nausea/vomiting. No fevers.            Medications: personally reviewed today in this patient's active orders section of epic  Allergies:   Allergies as of 03/29/2017   • (No Known Allergies)       PHYSICAL EXAM:  Visit Vitals   • /84 ([place] [facility] [place]: Sitting)   • Pulse 67   • Temp 98 °F (36.7 °C) (Oral)   • Resp 16   • Ht 5' 11\" (1.803 m)   • Wt 96.3 kg   • SpO2 97%   • BMI 29.61 kg/m2     General: laying comfortably in bed, well-nourished, appears as stated age  ENT: moist oral mucosa  Lungs: good air entry, clear breath sounds, no rales or wheezing  Heart: regular rate and rhythm, normal S1/S2, no gallop  Abdomen: normal bowel sounds, soft, non-distended, non-tender, no rebound or guarding  Extremities: no pedal edema or calf tenderness, right hand band for hemostasis  Skin: warm and moist, no active lesions    Labs:    Recent Labs  Lab 03/30/17  0720 03/29/17  1341   WBC 8.0 9.0   RBC 4.58 4.95   HGB 13.9 14.8   HCT 40.2 42.9    210   SEG 74 73       Recent Labs  Lab 03/30/17  0720 03/29/17  1341 03/27/17  1636   SODIUM 143 142 147*   POTASSIUM 3.8 3.6 4.1   CHLORIDE 109* 106 113*   CO2 27 27 24   BUN 13 17 24*   CREATININE 0.96 1.03 1.23*   GFRNA 87 80 64   GLUCOSE 97 95 91   CALCIUM 8.5 9.1 9.0   ALBUMIN  --  4.2 4.0   AST  --  21 17   GPT  --  37 33   BILIRUBIN  --  1.8* 1.0   ALKPT  --  98 98       Assessment & Plan:     · Chest pain- non-cardiac with only mild disease on coronary angiography  · Possibly muskuloskeletal or anxiety  · May d/c Brilinta, metoprolol and statin    · LBBB - no further treatment/work-up per cardiology    · DVT prophylaxis - no heparin  with continued bleeding from catheter site, SCDs/TEDS    Code status: Full Resuscitation    Disposition: tomorrow, until right wrist catheter site hemostasis is achieved    Josias Mandel MD  Hospitalist  3/30/2017  5:25 PM             None

## 2018-08-18 NOTE — ED ADULT TRIAGE NOTE - NS ED NOTE AC HIGH RISK COUNTRIES
No X Size Of Lesion In Cm (Optional): 0 Detail Level: Detailed Size Of Lesion In Cm (Optional): 0.4 Body Location Override (Optional - Billing Will Still Be Based On Selected Body Map Location If Applicable): right [place] of nose Body Location Override (Optional - Billing Will Still Be Based On Selected Body Map Location If Applicable): right upper temple

## 2018-08-19 ENCOUNTER — TRANSCRIPTION ENCOUNTER (OUTPATIENT)
Age: 63
End: 2018-08-19

## 2018-08-19 VITALS
HEART RATE: 60 BPM | TEMPERATURE: 98 F | RESPIRATION RATE: 17 BRPM | OXYGEN SATURATION: 96 % | SYSTOLIC BLOOD PRESSURE: 142 MMHG | DIASTOLIC BLOOD PRESSURE: 96 MMHG

## 2018-08-19 DIAGNOSIS — R63.8 OTHER SYMPTOMS AND SIGNS CONCERNING FOOD AND FLUID INTAKE: ICD-10-CM

## 2018-08-19 DIAGNOSIS — I77.810 THORACIC AORTIC ECTASIA: ICD-10-CM

## 2018-08-19 DIAGNOSIS — N17.9 ACUTE KIDNEY FAILURE, UNSPECIFIED: ICD-10-CM

## 2018-08-19 DIAGNOSIS — I26.99 OTHER PULMONARY EMBOLISM WITHOUT ACUTE COR PULMONALE: ICD-10-CM

## 2018-08-19 DIAGNOSIS — Z29.9 ENCOUNTER FOR PROPHYLACTIC MEASURES, UNSPECIFIED: ICD-10-CM

## 2018-08-19 DIAGNOSIS — I48.91 UNSPECIFIED ATRIAL FIBRILLATION: ICD-10-CM

## 2018-08-19 DIAGNOSIS — I10 ESSENTIAL (PRIMARY) HYPERTENSION: ICD-10-CM

## 2018-08-19 DIAGNOSIS — R09.89 OTHER SPECIFIED SYMPTOMS AND SIGNS INVOLVING THE CIRCULATORY AND RESPIRATORY SYSTEMS: ICD-10-CM

## 2018-08-19 LAB
ANION GAP SERPL CALC-SCNC: 12 MMOL/L — SIGNIFICANT CHANGE UP (ref 5–17)
APPEARANCE UR: CLEAR — SIGNIFICANT CHANGE UP
APTT BLD: 58.2 SEC — HIGH (ref 27.5–37.4)
APTT BLD: >200 SEC — CRITICAL HIGH (ref 27.5–37.4)
BILIRUB UR-MCNC: NEGATIVE — SIGNIFICANT CHANGE UP
BLD GP AB SCN SERPL QL: NEGATIVE — SIGNIFICANT CHANGE UP
BUN SERPL-MCNC: 18 MG/DL — SIGNIFICANT CHANGE UP (ref 7–23)
CALCIUM SERPL-MCNC: 8.5 MG/DL — SIGNIFICANT CHANGE UP (ref 8.4–10.5)
CHLORIDE SERPL-SCNC: 104 MMOL/L — SIGNIFICANT CHANGE UP (ref 96–108)
CO2 SERPL-SCNC: 26 MMOL/L — SIGNIFICANT CHANGE UP (ref 22–31)
COLOR SPEC: YELLOW — SIGNIFICANT CHANGE UP
CREAT ?TM UR-MCNC: 112 MG/DL — SIGNIFICANT CHANGE UP
CREAT SERPL-MCNC: 1.3 MG/DL — SIGNIFICANT CHANGE UP (ref 0.5–1.3)
DIFF PNL FLD: NEGATIVE — SIGNIFICANT CHANGE UP
EXTRA LAVENDER TOP TUBE: SIGNIFICANT CHANGE UP
GLUCOSE SERPL-MCNC: 101 MG/DL — HIGH (ref 70–99)
GLUCOSE UR QL: NEGATIVE — SIGNIFICANT CHANGE UP
HCT VFR BLD CALC: 38.8 % — LOW (ref 39–50)
HGB BLD-MCNC: 13.9 G/DL — SIGNIFICANT CHANGE UP (ref 13–17)
INR BLD: 1.11 — SIGNIFICANT CHANGE UP (ref 0.88–1.16)
INR BLD: 1.11 — SIGNIFICANT CHANGE UP (ref 0.88–1.16)
KETONES UR-MCNC: NEGATIVE — SIGNIFICANT CHANGE UP
LEUKOCYTE ESTERASE UR-ACNC: NEGATIVE — SIGNIFICANT CHANGE UP
MAGNESIUM SERPL-MCNC: 2.4 MG/DL — SIGNIFICANT CHANGE UP (ref 1.6–2.6)
MCHC RBC-ENTMCNC: 35.5 PG — HIGH (ref 27–34)
MCHC RBC-ENTMCNC: 35.8 G/DL — SIGNIFICANT CHANGE UP (ref 32–36)
MCV RBC AUTO: 99.2 FL — SIGNIFICANT CHANGE UP (ref 80–100)
NITRITE UR-MCNC: NEGATIVE — SIGNIFICANT CHANGE UP
PH UR: 7 — SIGNIFICANT CHANGE UP (ref 5–8)
PHOSPHATE SERPL-MCNC: 3.7 MG/DL — SIGNIFICANT CHANGE UP (ref 2.5–4.5)
PLATELET # BLD AUTO: 170 K/UL — SIGNIFICANT CHANGE UP (ref 150–400)
POTASSIUM SERPL-MCNC: 3.7 MMOL/L — SIGNIFICANT CHANGE UP (ref 3.5–5.3)
POTASSIUM SERPL-SCNC: 3.7 MMOL/L — SIGNIFICANT CHANGE UP (ref 3.5–5.3)
PROT UR-MCNC: NEGATIVE MG/DL — SIGNIFICANT CHANGE UP
PROTHROM AB SERPL-ACNC: 12.4 SEC — SIGNIFICANT CHANGE UP (ref 9.8–12.7)
PROTHROM AB SERPL-ACNC: 12.4 SEC — SIGNIFICANT CHANGE UP (ref 9.8–12.7)
RBC # BLD: 3.91 M/UL — LOW (ref 4.2–5.8)
RBC # FLD: 13.2 % — SIGNIFICANT CHANGE UP (ref 10.3–16.9)
RH IG SCN BLD-IMP: POSITIVE — SIGNIFICANT CHANGE UP
SODIUM SERPL-SCNC: 142 MMOL/L — SIGNIFICANT CHANGE UP (ref 135–145)
SODIUM UR-SCNC: 163 MMOL/L — SIGNIFICANT CHANGE UP
SP GR SPEC: 1.01 — SIGNIFICANT CHANGE UP (ref 1–1.03)
UROBILINOGEN FLD QL: 0.2 E.U./DL — SIGNIFICANT CHANGE UP
UUN UR-MCNC: 683 MG/DL — SIGNIFICANT CHANGE UP
WBC # BLD: 3.8 K/UL — SIGNIFICANT CHANGE UP (ref 3.8–10.5)
WBC # FLD AUTO: 3.8 K/UL — SIGNIFICANT CHANGE UP (ref 3.8–10.5)

## 2018-08-19 PROCEDURE — 71275 CT ANGIOGRAPHY CHEST: CPT

## 2018-08-19 PROCEDURE — 99285 EMERGENCY DEPT VISIT HI MDM: CPT | Mod: 25

## 2018-08-19 PROCEDURE — 96374 THER/PROPH/DIAG INJ IV PUSH: CPT

## 2018-08-19 PROCEDURE — 86850 RBC ANTIBODY SCREEN: CPT

## 2018-08-19 PROCEDURE — 83880 ASSAY OF NATRIURETIC PEPTIDE: CPT

## 2018-08-19 PROCEDURE — 84484 ASSAY OF TROPONIN QUANT: CPT

## 2018-08-19 PROCEDURE — 94640 AIRWAY INHALATION TREATMENT: CPT

## 2018-08-19 PROCEDURE — 83735 ASSAY OF MAGNESIUM: CPT

## 2018-08-19 PROCEDURE — 85730 THROMBOPLASTIN TIME PARTIAL: CPT

## 2018-08-19 PROCEDURE — 84540 ASSAY OF URINE/UREA-N: CPT

## 2018-08-19 PROCEDURE — 36415 COLL VENOUS BLD VENIPUNCTURE: CPT

## 2018-08-19 PROCEDURE — 85027 COMPLETE CBC AUTOMATED: CPT

## 2018-08-19 PROCEDURE — 71046 X-RAY EXAM CHEST 2 VIEWS: CPT

## 2018-08-19 PROCEDURE — 84100 ASSAY OF PHOSPHORUS: CPT

## 2018-08-19 PROCEDURE — 82962 GLUCOSE BLOOD TEST: CPT

## 2018-08-19 PROCEDURE — 81003 URINALYSIS AUTO W/O SCOPE: CPT

## 2018-08-19 PROCEDURE — 86900 BLOOD TYPING SEROLOGIC ABO: CPT

## 2018-08-19 PROCEDURE — 99239 HOSP IP/OBS DSCHRG MGMT >30: CPT

## 2018-08-19 PROCEDURE — 85610 PROTHROMBIN TIME: CPT

## 2018-08-19 PROCEDURE — 80048 BASIC METABOLIC PNL TOTAL CA: CPT

## 2018-08-19 PROCEDURE — 82570 ASSAY OF URINE CREATININE: CPT

## 2018-08-19 PROCEDURE — 84300 ASSAY OF URINE SODIUM: CPT

## 2018-08-19 PROCEDURE — 80053 COMPREHEN METABOLIC PANEL: CPT

## 2018-08-19 PROCEDURE — 86901 BLOOD TYPING SEROLOGIC RH(D): CPT

## 2018-08-19 PROCEDURE — 82550 ASSAY OF CK (CPK): CPT

## 2018-08-19 PROCEDURE — 82553 CREATINE MB FRACTION: CPT

## 2018-08-19 PROCEDURE — 85025 COMPLETE CBC W/AUTO DIFF WBC: CPT

## 2018-08-19 RX ORDER — APIXABAN 2.5 MG/1
2 TABLET, FILM COATED ORAL
Qty: 26 | Refills: 0 | OUTPATIENT
Start: 2018-08-19 | End: 2018-08-25

## 2018-08-19 RX ORDER — APIXABAN 2.5 MG/1
1 TABLET, FILM COATED ORAL
Qty: 60 | Refills: 0 | OUTPATIENT
Start: 2018-08-19 | End: 2018-09-17

## 2018-08-19 RX ORDER — POTASSIUM CHLORIDE 20 MEQ
20 PACKET (EA) ORAL ONCE
Qty: 0 | Refills: 0 | Status: COMPLETED | OUTPATIENT
Start: 2018-08-19 | End: 2018-08-19

## 2018-08-19 RX ORDER — FLUTICASONE PROPIONATE 50 MCG
1 SPRAY, SUSPENSION NASAL
Qty: 0 | Refills: 0 | Status: DISCONTINUED | OUTPATIENT
Start: 2018-08-19 | End: 2018-08-19

## 2018-08-19 RX ORDER — APIXABAN 2.5 MG/1
10 TABLET, FILM COATED ORAL EVERY 12 HOURS
Qty: 0 | Refills: 0 | Status: DISCONTINUED | OUTPATIENT
Start: 2018-08-19 | End: 2018-08-19

## 2018-08-19 RX ORDER — ENOXAPARIN SODIUM 100 MG/ML
110 INJECTION SUBCUTANEOUS
Qty: 0 | Refills: 0 | Status: DISCONTINUED | OUTPATIENT
Start: 2018-08-19 | End: 2018-08-19

## 2018-08-19 RX ORDER — NIFEDIPINE 30 MG
30 TABLET, EXTENDED RELEASE 24 HR ORAL DAILY
Qty: 0 | Refills: 0 | Status: DISCONTINUED | OUTPATIENT
Start: 2018-08-19 | End: 2018-08-19

## 2018-08-19 RX ADMIN — APIXABAN 10 MILLIGRAM(S): 2.5 TABLET, FILM COATED ORAL at 07:37

## 2018-08-19 RX ADMIN — Medication 1 SPRAY(S): at 06:45

## 2018-08-19 RX ADMIN — Medication 30 MILLIGRAM(S): at 06:45

## 2018-08-19 RX ADMIN — Medication 20 MILLIEQUIVALENT(S): at 06:44

## 2018-08-19 NOTE — DISCHARGE NOTE ADULT - MEDICATION SUMMARY - MEDICATIONS TO TAKE
I will START or STAY ON the medications listed below when I get home from the hospital:    apixaban 5 mg oral tablet  -- 2 tab(s) by mouth every 12 hours  -- Indication: For Pulmonary thromboembolism    apixaban 5 mg oral tablet  -- 1 tab(s) by mouth 2 times a day   -- Check with your doctor before becoming pregnant.  It is very important that you take or use this exactly as directed.  Do not skip doses or discontinue unless directed by your doctor.  Obtain medical advice before taking any non-prescription drugs as some may affect the action of this medication.    -- Indication: For Pulmonary thromboembolism    albuterol 90 mcg/inh inhalation aerosol  -- 2 puff(s) inhaled every 6 hours, As Needed  -- For inhalation only.  It is very important that you take or use this exactly as directed.  Do not skip doses or discontinue unless directed by your doctor.  Obtain medical advice before taking any non-prescription drugs as some may affect the action of this medication.  Shake well before use.    -- Indication: For Asthma     NIFEdipine 30 mg oral tablet, extended release  -- 1 tab(s) by mouth once a day   -- Avoid grapefruit and grapefruit juice while taking this medication.  It is very important that you take or use this exactly as directed.  Do not skip doses or discontinue unless directed by your doctor.  Some non-prescription drugs may aggravate your condition.  Read all labels carefully.  If a warning appears, check with your doctor before taking.  Swallow whole.  Do not crush.    -- Indication: For High Blood pressure

## 2018-08-19 NOTE — H&P ADULT - NSHPPHYSICALEXAM_GEN_ALL_CORE
.  VITAL SIGNS:  T(C): 36.4 (08-18-18 @ 23:29), Max: 36.9 (08-18-18 @ 20:51)  T(F): 97.6 (08-18-18 @ 23:29), Max: 98.5 (08-18-18 @ 20:51)  HR: 63 (08-18-18 @ 23:29) (60 - 64)  BP: 147/89 (08-18-18 @ 23:29) (106/60 - 147/89)  BP(mean): --  RR: 16 (08-18-18 @ 23:29) (16 - 20)  SpO2: 95% (08-18-18 @ 23:29) (94% - 97%)  Wt(kg): --    PHYSICAL EXAM:    Constitutional: WDWN, sitting comfortably in bed, NAD  Head: NC/AT  Eyes: PERRLA, EOMI, clear conjunctiva  ENT: no nasal discharge; no oropharyngeal erythema or exudates; moist oral mucosa  Neck: supple; no JVD or thyromegaly  Respiratory: CTA B/L; no W/R/R, no accessory muscle use   Cardiac: +S1/S2; RRR; no M/R/G; PMI non-displaced. PPM located on left chest wall  Gastrointestinal: soft, NT/ND; no rebound or guarding; +BS, no hepatosplenomegaly  Extremities: WWP, no clubbing or cyanosis. 1+ pitting edema in b/l LE up to lower shins R calf slightly bigger than left calf. No calf tenderness  Vascular: 2+ radial, DP/PT pulses B/L  Lymphatic: no submandibular or cervical LAD  Neurologic: AAOx3; answers questions appropriately, follows commands, moves all extremities

## 2018-08-19 NOTE — H&P ADULT - NSHPREVIEWOFSYSTEMS_GEN_ALL_CORE
REVIEW OF SYSTEMS:    CONSTITUTIONAL: No weakness, fevers or chills  EYES/ENT: No visual changes;  No vertigo or throat pain   NECK: No pain or stiffness  RESPIRATORY: Chest congestion plus mild SOB. No cough, wheezing, hemoptysis;   CARDIOVASCULAR: No chest pain or palpitations  GASTROINTESTINAL: No abdominal or epigastric pain. No nausea, vomiting, or hematemesis; No diarrhea or constipation. No melena or hematochezia.  GENITOURINARY: No dysuria, frequency or hematuria  NEUROLOGICAL: No numbness or weakness  SKIN: No itching, burning, rashes, or lesions   MSK: no joint pain, no joint swelling  All other review of systems is negative unless indicated above.

## 2018-08-19 NOTE — DISCHARGE NOTE ADULT - PLAN OF CARE
To provide treatment You were found to have a pulmonary embolism. This is a blood clot in your lungs. You will need to take a medication called Eliquis. You will take 10mg twice a day for the first 7 days then transition to 5mg twice a day afterwards. You have been sent a prescription for 30 days. You will need to follow up with your cardiologist to obtain additional prescription. You will need to be on this medication for potentially 3-6months. On the CT scan you were found to have a 4.5cm aortic aneurysm. You were evaluated by the cardiothoracic surgeons who will follow up with you as an outpatient. Please continue your home blood pressure medication.

## 2018-08-19 NOTE — CONSULT NOTE ADULT - SUBJECTIVE AND OBJECTIVE BOX
Surgeon: Dr. Martinez    Requesting Physician: Dr. Magallon    HISTORY OF PRESENT ILLNESS:    63 y/o M former 75 pack-year smoker with PMHx of Afib s/p PPM (not on AC), HTN, HLD, CKD?, nephrolithiasis who presented last night with SOB and was found to ahve a PE  PAST MEDICAL & SURGICAL HISTORY:  Syncope, unspecified syncope type  Atrial fibrillation  High cholesterol  BPH (benign prostatic hyperplasia)  GERD (gastroesophageal reflux disease)  HTN (hypertension)  Cardiac pacemaker      MEDICATIONS  (STANDING):  apixaban 10 milliGRAM(s) Oral every 12 hours  fluticasone propionate 50 MICROgram(s)/spray Nasal Spray 1 Spray(s) Both Nostrils two times a day  NIFEdipine XL 30 milliGRAM(s) Oral daily    MEDICATIONS  (PRN):      Allergies    No Known Allergies    Intolerances        SOCIAL HISTORY:  Smoker:  YES / NO        PACK YEARS:                         WHEN QUIT?  ETOH use:  YES / NO               FREQUENCY / QUANTITY:  Ilicit Drug use:  YES / NO  Occupation:  Assisted device use (Cane / Walker):  Live with:    FAMILY HISTORY:  Family history of hypertension (Mother)  Diabetes mellitus, type 2 (Mother)  Coronary artery disease (Mother)      Review of Systems  CONSTITUTIONAL:  Fevers / chills, sweats, fatigue, weight loss, weight gain                                    NEGATIVE  NEURO:  parathesias, seizures, syncope, confusion                                                                               NEGATIVE  EYES:  Blurry vision, discharge, pain, loss of vision                                                                                  NEGATIVE  ENMT:  Difficulty hearing, vertigo, dysphagia, epistaxis, recent dental work                                     NEGATIVE  CV:  Chest pain, palpitations, QUINN, orthopnea                                                                                         NEGATIVE  RESPIRATORY:  Wheezing, SOB, cough / sputum, hemoptysis                                                              NEGATIVE  GI:  Nausea, vommiting, diarrhea, constipation, melena                                                                        NEGATIVE  : Hematuria, dysuria, urgency, incontinence                                                                                       NEGATIVE  MUSKULOSKELETAL:  arthritis, joint swelling, muscle weakness                                                           NEGATIVE  SKIN/BREAST:  rash, itching, payam loss, masses                                                                                            NEGATIVE  PSYCH:  depresion, anxiety, suicidal ideation                                                                                             NEGATIVE  HEME/LYMPH:  bruises easily, enlarged lymph nodes, tender lymph nodes                                        NEGATIVE  ENDOCRINE:  cold intolerance, heat intolerance, polydipsia                                                                   NEGATIVE    PHYSICAL EXAM  Vital Signs Last 24 Hrs  T(C): 36.7 (19 Aug 2018 08:29), Max: 36.9 (18 Aug 2018 20:51)  T(F): 98 (19 Aug 2018 08:29), Max: 98.5 (18 Aug 2018 20:51)  HR: 60 (19 Aug 2018 08:29) (60 - 64)  BP: 142/96 (19 Aug 2018 08:29) (106/60 - 147/89)  BP(mean): --  RR: 17 (19 Aug 2018 08:29) (16 - 20)  SpO2: 96% (19 Aug 2018 08:29) (94% - 97%)    CONSTITUTIONAL:                                                                          WNL  NEURO:                                                                                             WNL                      EYES:                                                                                                  WNL  ENMT:                                                                                                WNL  CV:                                                                                                      WNL  RESPIRATORY:                                                                                  WNL  GI:                                                                                                       WNL  : MURCIA + / -                                                                                 WNL  MUSKULOSKELETAL:                                                                       WNL  SKIN / BREAST:                                                                                 WNL                                                          LABS:                        13.9   3.8   )-----------( 170      ( 19 Aug 2018 04:19 )             38.8     08-19    142  |  104  |  18  ----------------------------<  101<H>  3.7   |  26  |  1.30    Ca    8.5      19 Aug 2018 04:20  Phos  3.7       Mg     2.4         TPro  8.1  /  Alb  4.1  /  TBili  0.4  /  DBili  x   /  AST  23  /  ALT  23  /  AlkPhos  91  08-18    PT/INR - ( 19 Aug 2018 08:09 )   PT: 12.4 sec;   INR: 1.11          PTT - ( 19 Aug 2018 08:09 )  PTT:58.2 sec  Urinalysis Basic - ( 19 Aug 2018 04:58 )    Color: Yellow / Appearance: Clear / S.010 / pH: x  Gluc: x / Ketone: NEGATIVE  / Bili: Negative / Urobili: 0.2 E.U./dL   Blood: x / Protein: NEGATIVE mg/dL / Nitrite: NEGATIVE   Leuk Esterase: NEGATIVE / RBC: x / WBC x   Sq Epi: x / Non Sq Epi: x / Bacteria: x      CARDIAC MARKERS ( 18 Aug 2018 17:00 )  x     / <0.01 ng/mL / 552 U/L / x     / 4.5 ng/mL          RADIOLOGY & ADDITIONAL STUDIES:  CAROTID U/S:    CXR:    CT Scan:    EKG:    TTE / NGUYEN:    Cardiac Cath: Surgeon: Dr. Martinez    Requesting Physician: Dr. Magallon    HISTORY OF PRESENT ILLNESS:    61 y/o M former 75 pack-year smoker with PMHx of Afib s/p PPM (not on AC), HTN, HLD, CKD (cr 1.3), nephrolithiasis who presented last night with SOB and was found to have a PE.  CTA of the chest incidentally revealed a 4.5cm aortic root aneurysm.  Patient denies any chest pain or back pain in the past.  He has been on nifedipine for his HTN for the past 4 years and admits to not being compliant with it on a daily basis initially, but for the past year he has been taking it every day and monitors his blood pressure at home with an automated BP cuff.  He has no other complaints.  He was started on a heparin gtt for PE and is planned to return home today with NOAC.    PAST MEDICAL & SURGICAL HISTORY:  Syncope, unspecified syncope type  Atrial fibrillation  High cholesterol  BPH (benign prostatic hyperplasia)  GERD (gastroesophageal reflux disease)  HTN (hypertension)  Cardiac pacemaker      MEDICATIONS  (STANDING):  apixaban 10 milliGRAM(s) Oral every 12 hours  fluticasone propionate 50 MICROgram(s)/spray Nasal Spray 1 Spray(s) Both Nostrils two times a day  NIFEdipine XL 30 milliGRAM(s) Oral daily    MEDICATIONS  (PRN):    Allergies    No Known Allergies    Intolerances      SOCIAL HISTORY:  Smoker:  YES         PACK YEARS: 75                        WHEN QUIT: 7 years ago  ETOH use:   NO                Ilicit Drug use:   NO    FAMILY HISTORY:  Family history of hypertension (Mother)  Diabetes mellitus, type 2 (Mother)  Coronary artery disease (Mother)      Review of Systems  CONSTITUTIONAL: reports decreased ecercise tolerance, Denies Fevers / chills, sweats, fatigue, weight loss, weight gain                                     NEURO:  Denies parathesias, seizures, syncope, confusion                                                    EYES:  Denies Blurry vision, discharge, pain, loss of vision                                                                                 ENMT:  Denies Difficulty hearing, vertigo, dysphagia, epistaxis, recent dental work                                       CV:  Reports QUINN as per HPI, denies Chest pain, palpitations, orthopnea                                                                                RESPIRATORY:  SOB as per HPI denies Wheezing, cough / sputum, hemoptysis                                                                GI: Denies Nausea, vommiting, diarrhea, constipation, melena                                                                      : Denies Hematuria, dysuria, urgency, incontinence                                                               MUSKULOSKELETAL: Denies arthritis, joint swelling, muscle weakness                                                SKIN/BREAST:  Denies rash, itching, payam loss, masses                                                            PSYCH: Denies  depresion, anxiety, suicidal ideation                                                                        HEME/LYMPH: Denies  bruises easily, enlarged lymph nodes, tender lymph nodes                                          ENDOCRINE: Denies cold intolerance, heat intolerance, polydipsia                                                           PHYSICAL EXAM  Vital Signs Last 24 Hrs  T(C): 36.7 (19 Aug 2018 08:29), Max: 36.9 (18 Aug 2018 20:51)  T(F): 98 (19 Aug 2018 08:29), Max: 98.5 (18 Aug 2018 20:51)  HR: 60 (19 Aug 2018 08:29) (60 - 64)  BP: 142/96 (19 Aug 2018 08:29) (106/60 - 147/89)  BP(mean): --  RR: 17 (19 Aug 2018 08:29) (16 - 20)  SpO2: 96% (19 Aug 2018 08:29) (94% - 97%)    CONSTITUTIONAL: NAD, WN/WD  NEURO:  A&Ox3                EYES: PERRL/A, EOMI, sclera anicteric  ENMT: MMM  CV: S1S2 RRR, no appreciable murmur  RESPIRATORY:  CTA b/l no W/R/R  GI:  soft NT/ND +BS  : no Juarez  MUSKULOSKELETAL: no kyphosis/scoliosis  SKIN / BREAST:  no rashes/lesions                                                          LABS:                        13.9   3.8   )-----------( 170      ( 19 Aug 2018 04:19 )             38.8     08-19    142  |  104  |  18  ----------------------------<  101<H>  3.7   |  26  |  1.30    Ca    8.5      19 Aug 2018 04:20  Phos  3.7     08-19  Mg     2.4     08-19    TPro  8.1  /  Alb  4.1  /  TBili  0.4  /  DBili  x   /  AST  23  /  ALT  23  /  AlkPhos  91  08-18    PT/INR - ( 19 Aug 2018 08:09 )   PT: 12.4 sec;   INR: 1.11          PTT - ( 19 Aug 2018 08:09 )  PTT:58.2 sec  Urinalysis Basic - ( 19 Aug 2018 04:58 )    Color: Yellow / Appearance: Clear / S.010 / pH: x  Gluc: x / Ketone: NEGATIVE  / Bili: Negative / Urobili: 0.2 E.U./dL   Blood: x / Protein: NEGATIVE mg/dL / Nitrite: NEGATIVE   Leuk Esterase: NEGATIVE / RBC: x / WBC x   Sq Epi: x / Non Sq Epi: x / Bacteria: x      CARDIAC MARKERS ( 18 Aug 2018 17:00 )  x     / <0.01 ng/mL / 552 U/L / x     / 4.5 ng/mL          RADIOLOGY & ADDITIONAL STUDIES:    CXR: no effusions/infiltrates    CT Scan: 4.5cm dilation of aortic root, b/l subsegmental PEs    EKG: AV paced at 62bpm    TTE / NGUYEN: pending

## 2018-08-19 NOTE — H&P ADULT - PROBLEM SELECTOR PLAN 3
Cr 1.68, baseline appears to be 1.2-1.3. Pt follows up with a nephrologist so likely has underlying CKD. He also has history of renal cyst and nephrolithiasis, denies urinary retention, hematuria, dysuria  - obtain UA and urine lytes  - pt is urinating but will check for PVR Cr 1.68, baseline appears to be 1.2-1.3. Pt follows up with a nephrologist so likely has underlying CKD. He also has history of renal cyst and nephrolithiasis, denies urinary retention, hematuria, dysuria  - obtain UA and urine lytes

## 2018-08-19 NOTE — DISCHARGE NOTE ADULT - CARE PROVIDERS DIRECT ADDRESSES
[email or web address]
numerical 0-10

## 2018-08-19 NOTE — DISCHARGE NOTE ADULT - HOSPITAL COURSE
63yo M former 75 pack-year smoker with PMHx of Afib s/p PPM (not on AC), HTN, HLD, CKD?, nephrolithiasis who presents with chest congestion, SOB associated with decreased exercise tolerance for 1 month. Upon arrival 63yo M former 75 pack-year smoker with PMHx of Afib s/p PPM (not on AC), HTN, HLD, CKD?, nephrolithiasis who presents with chest congestion, SOB associated with decreased exercise tolerance for 1 month. Upon arrival CTA performed showing b/l subsegmental pulmonary embolisms with no signs of right heart strain. Patient started on Heparin and transitioned to Eliquis. Of note patient found to have a 4.5cm aortic root aneurysm. Patient evaluated by Cardiothoracic surgery and will follow up as an outpatient. Patient is stable for discharge.

## 2018-08-19 NOTE — H&P ADULT - FAMILY HISTORY
Mother  Still living? Unknown  Coronary artery disease, Age at diagnosis: Age Unknown  Diabetes mellitus, type 2, Age at diagnosis: Age Unknown  Family history of hypertension, Age at diagnosis: Age Unknown

## 2018-08-19 NOTE — DISCHARGE NOTE ADULT - CARE PROVIDER_API CALL
Dre Martinez (MD), Surgery; Thoracic and Cardiac Surgery  [address]  [address] [facility] [place] [address]  Phone: [phone]  Fax: [phone]

## 2018-08-19 NOTE — H&P ADULT - PROBLEM SELECTOR PLAN 7
on Heparin gtt for PE treatment    1) PCP Contacted on Admission: (Y/N) --> No  Name & Phone #: Armando Jung [phone]  2) Date of Contact with PCP:  3) PCP Contacted at Discharge: (Y/N, N/A)  4) Summary of Handoff Given to PCP:   5) Post-Discharge Appointment Date and Location: on Heparin gtt for PE treatment    1) PCP Contacted on Admission: (Y/N) --> No, overnight admission  Name & Phone #: Armando Jung [phone]  2) Date of Contact with PCP:  3) PCP Contacted at Discharge: (Y/N, N/A)  4) Summary of Handoff Given to PCP:   5) Post-Discharge Appointment Date and Location:

## 2018-08-19 NOTE — H&P ADULT - ASSESSMENT
Patient is a 63yo M former 75 pack-year smoker with PMHx of Afib s/p PPM (not on AC), HTN, nephrolithiasis who presents with chest congestion, SOB associated with decreased ET x 1 month.

## 2018-08-19 NOTE — H&P ADULT - PROBLEM SELECTOR PLAN 2
History of Afib with [employer] PPM for bradycardia, interrogated in March 2018 for workup of syncope which showed normal functioning device. CHADVasc 1. Pt is on Aspirin at home   - pt is currently on AC for PE treatment. Pt is aware that as he ages, his risk for stroke increases with persistent Afib. AC for stroke PPx to be discussed with his cardiologist History of Afib with [employer] PPM for bradycardia, interrogated in March 2018 for workup of syncope which showed normal functioning device. CHADVasc 1. Pt is on Aspirin at home   - pt is currently on AC for PE treatment. Pt is aware that as he ages, his risk for stroke increases with persistent Afib. AC for stroke PPx to be discussed with his cardiologist  - he is not on rate control meds, though has history of bradycardia, now with PPM

## 2018-08-19 NOTE — CONSULT NOTE ADULT - ASSESSMENT
A/P: 63 y/o M admitted with PE and incidentally found to have 4.5cm aortic root aneurysm  Neurovascular: No delirium, pain well managed on current regimen  -c/w PRNs for Pain control  -Monitor neuro status    Respiratory: Saturates well on RA  -Encourage IS 10x/hour while awake, Cough and deep breathing exercises  -Monitor respiratory status via SpO2    Cardiovascular: Aortic Root aneurysm BP well controlled  -patient instructed to follow up in the office with Dr. Martinez  -try to get TTE before discharge  -c/w nifedipine  -encouraged patient to monitor and record BP at home  -Monitor HR/BP/Tele    /Renal: Acute on CRI, Cr trending down from 1.6 on admission to 1.3 this AM  -BUN/Cr: 18/1.3  -Trend Cr on AM labs if patient remains in hospital  -Replete electrolytes as needed    ID: Afebrile, asymptomatic  -WCC: 3.8  -Continue to monitor for SIRS/Sepsis syndrome while inpatient    Heme:   -H/H: 13.9/38.8  -repeat CBC in AM  -DVT ppx: HSQ 7500 u q8h and SCDs    Disposition: Follow up in office

## 2018-08-19 NOTE — H&P ADULT - PROBLEM SELECTOR PLAN 1
CTA PE shows bilateral segmental filling defects in the right upper, right lower, and left lower lobes, no evidence of RV strain. EKG also without evidence of RH strain, Trops negative, BNP elevated to 707 but likely baseline (was 666 in March). This would be unprovoked given no risk factors. Pt with significant smoking history that raises concern for malignancy though no nodules or mass seen on CT chest and no B symptoms in history. Pt is currently breathing comfortably on RA, no tachycardia.  - s/p Heparin bolus in ED  - c/w heparin gtt, PTT q4-6hrs with goal PTT 60-80  - LE duplex to assess for DVT  - switch to NOAC tomorrow if renal fxn improves or remain stable CTA PE shows bilateral segmental filling defects in the right upper, right lower, and left lower lobes, no evidence of RV strain. EKG also without evidence of RH strain, Trops negative, BNP elevated to 707 but likely baseline (was 666 in March). This would be unprovoked given no risk factors. Pt with significant smoking history that raises concern for malignancy though no nodules or mass seen on CT chest and no B symptoms in history. Pt is currently breathing comfortably on RA, no tachycardia.  - s/p Heparin bolus in ED  - c/w heparin gtt, PTT q4-6hrs with goal PTT 60-80  - LE duplex to assess for DVT  - switch to NOAC tomorrow if renal fxn improves or remain stable.

## 2018-08-19 NOTE — DISCHARGE NOTE ADULT - ADDITIONAL INSTRUCTIONS
Please follow up with your Cardiologist within 1 week of discharge.     Please follow up with your Primary care doctor within 1 week of discharge.     We will call you with an appointment for Dr. Martinez (Cardiothoracic surgery) tomorrow.

## 2018-08-19 NOTE — H&P ADULT - NSHPOUTPATIENTPROVIDERS_GEN_ALL_CORE
(PCP) Armando Jung [phone]  (Nephrologist) Glenna Stevenson [phone]  (Cardiology) June Balderrama [phone]

## 2018-08-19 NOTE — H&P ADULT - NSHPLABSRESULTS_GEN_ALL_CORE
.  LABS:                         14.2   2.9   )-----------( 206      ( 18 Aug 2018 17:00 )             43.4     08-18    142  |  103  |  18  ----------------------------<  106<H>  3.8   |  20<L>  |  1.68<H>    Ca    8.9      18 Aug 2018 17:00    TPro  8.1  /  Alb  4.1  /  TBili  0.4  /  DBili  x   /  AST  23  /  ALT  23  /  AlkPhos  91  08-18    PT/INR - ( 18 Aug 2018 17:00 )   PT: 12.1 sec;   INR: 1.09          PTT - ( 18 Aug 2018 17:00 )  PTT:27.1 sec    CARDIAC MARKERS ( 18 Aug 2018 17:00 )  x     / <0.01 ng/mL / 552 U/L / x     / 4.5 ng/mL      Serum Pro-Brain Natriuretic Peptide: 707 pg/mL (08-18 @ 17:00)        RADIOLOGY, EKG & ADDITIONAL TESTS: Reviewed.     EKG: Atrial fibrillation with Ventricular pacing, no ischemic changes, no evidence of Right heart strain    < from: CT Angio Chest PE Protocol w/ IV Cont (08.18.18 @ 18:42) >    CTA (CT angiography) of the CHEST     INDICATION: Shortness of breath. Assess for pulmonary embolism.    TECHNIQUE: CT angiography of the chest was performed during bolus   injection of intravenous contrast.  Post-processing including the   production of axial, coronal and sagittal multiplanar reformatted images   and axial and coronal maximum intensity projections (MIPs) was performed.   92 cc of Optiray 350 was administered intravenously. 8 cc was discarded.    PRIOR STUDY: None.    FINDINGS: There are bilateral segmental filling defects in the right   upper, right lower, and left lower lobes (series 5 images 76, 91, 93 and   113). There is suboptimal evaluation of the pulmonary vasculature   secondary to streak artifacts as well as areas of respiratory motion.   There is no evidence of right heart strain. The pulmonary artery is   dilated measuring 3.3 cm, suggestive of pulmonary hypertension.    The aortic root is dilated measuring 4.5 cm. Otherwise the aorta is not   dilated. The heart is normal in size. No pericardial effusion is seen.     No mediastinal, hilar or axillary lymphadenopathy is seen.    No pleural effusions are seen. There is paraseptal and centrilobular   emphysematous changes. Atelectatic changes in both lower lobes. Small   patchy groundglass infiltrate seen posteriorly in the right lower lobe   which may represent developing pneumonia. Tree-in-bud type groundglass   nodular densities also noted in the right lower lobe.    Limited evaluation of the upper abdomen demonstrates no abnormality.   Small hiatal hernia. Implantable cardiac device in left chest with leads   in the right atrium and right ventricle.    Evaluation of the osseous structures demonstrates mild degenerative   changes of the spine.      IMPRESSION:   Suboptimal evaluation of the pulmonary vasculature secondary to streak   artifacts as well as areas of respiratory motion. Bilateral subtle   segmental filling defects in the right upper, right lower, and left lower   lobes which may represent pulmonary emboli. No evidence of right heart   strain.    Dilated pulmonary artery measuring 3.3 cm, suggestive of pulmonary   hypertension.    Aneurysmal dilatation of the aortic root measuring 4.5 cm.    < end of copied text >    CHEST XRAY: small left pleural effusion, pacemaker leads in RA and RV, clear lungs

## 2018-08-19 NOTE — H&P ADULT - HISTORY OF PRESENT ILLNESS
Patient is a 61yo M former 75 pack-year smoker with PMHx of Afib s/p PPM (not on AC), HTN who presents with chest congestion, SOB associated with decreased ET x 1 month. Pt states that about a month ago, he started to have chest congestion and cough and was treated for sinusitis with amoxacillin then cefuroxime and flonase (due to minimal improvement with amoxacillin). He did not feel any improvement after finishing the course of Abx and felt decreased in exercise tolerance. He is still able to walk 1-2 miles but has to walk slower than before due to mild SOB. He endorses intermittent bilateral LE swelling which he attributes to walking, however, swelling has gotten worse over the past couple of weeks. He denies fevers, chills, night sweats, weight loss, hemoptysis, Patient is a 61yo M former 75 pack-year smoker with PMHx of Afib s/p PPM (not on AC), HTN, HLD, CKD?, nephrolithiasis who presents with chest congestion, SOB associated with decreased ET x 1 month. Pt states that about a month ago, he started to have chest congestion and cough and was treated for sinusitis with amoxacillin then cefuroxime and flonase (due to minimal improvement with amoxacillin). He did not feel any improvement after finishing the course of Abx and felt decreased in exercise tolerance. He is still able to walk 1-2 miles but has to walk slower than before due to mild SOB. He endorses intermittent bilateral LE swelling which he attributes to walking, however, swelling has gotten worse over the past couple of weeks. He denies fevers, chills, night sweats, weight loss, hemoptysis, productive sputum, chest pain, palpitations, abdominal pain, nausea, vomiting. He denies recent travels, surgeries, immobilizations. No personal or family history of blood clots or strokes

## 2018-08-19 NOTE — DISCHARGE NOTE ADULT - CARE PLAN
Principal Discharge DX:	Bilateral pulmonary embolism  Goal:	To provide treatment  Assessment and plan of treatment:	You were found to have a pulmonary embolism. This is a blood clot in your lungs. You will need to take a medication called Eliquis. You will take 10mg twice a day for the first 7 days then transition to 5mg twice a day afterwards. You have been sent a prescription for 30 days. You will need to follow up with your cardiologist to obtain additional prescription. You will need to be on this medication for potentially 3-6months.  Secondary Diagnosis:	Aortic aneurysm  Assessment and plan of treatment:	On the CT scan you were found to have a 4.5cm aortic aneurysm. You were evaluated by the cardiothoracic surgeons who will follow up with you as an outpatient.  Secondary Diagnosis:	HTN (hypertension)  Assessment and plan of treatment:	Please continue your home blood pressure medication.

## 2018-08-19 NOTE — DISCHARGE NOTE ADULT - PATIENT PORTAL LINK FT
You can access the [facility] Patient Portal, offered by [facility], by registering with the following website: [email or web address]

## 2018-08-20 PROBLEM — I71.2 THORACIC AORTIC ANEURYSM WITHOUT RUPTURE: Status: RESOLVED | Noted: 2018-08-20 | Resolved: 2018-08-20

## 2018-08-22 ENCOUNTER — APPOINTMENT (OUTPATIENT)
Dept: CARDIOTHORACIC SURGERY | Facility: CLINIC | Age: 63
End: 2018-08-22
Payer: COMMERCIAL

## 2018-08-22 VITALS
HEART RATE: 64 BPM | TEMPERATURE: 97.6 F | OXYGEN SATURATION: 97 % | SYSTOLIC BLOOD PRESSURE: 135 MMHG | DIASTOLIC BLOOD PRESSURE: 86 MMHG | WEIGHT: 245 LBS | BODY MASS INDEX: 31.46 KG/M2 | RESPIRATION RATE: 18 BRPM

## 2018-08-22 DIAGNOSIS — I77.810 THORACIC AORTIC ECTASIA: ICD-10-CM

## 2018-08-22 DIAGNOSIS — I71.2 THORACIC AORTIC ANEURYSM, W/OUT RUPTURE: ICD-10-CM

## 2018-08-22 PROCEDURE — 99214 OFFICE O/P EST MOD 30 MIN: CPT

## 2018-08-23 DIAGNOSIS — Z87.891 PERSONAL HISTORY OF NICOTINE DEPENDENCE: ICD-10-CM

## 2018-08-23 DIAGNOSIS — I26.99 OTHER PULMONARY EMBOLISM WITHOUT ACUTE COR PULMONALE: ICD-10-CM

## 2018-08-23 DIAGNOSIS — Z79.82 LONG TERM (CURRENT) USE OF ASPIRIN: ICD-10-CM

## 2018-08-23 DIAGNOSIS — N18.9 CHRONIC KIDNEY DISEASE, UNSPECIFIED: ICD-10-CM

## 2018-08-23 DIAGNOSIS — N40.0 BENIGN PROSTATIC HYPERPLASIA WITHOUT LOWER URINARY TRACT SYMPTOMS: ICD-10-CM

## 2018-08-23 DIAGNOSIS — Z83.3 FAMILY HISTORY OF DIABETES MELLITUS: ICD-10-CM

## 2018-08-23 DIAGNOSIS — E78.5 HYPERLIPIDEMIA, UNSPECIFIED: ICD-10-CM

## 2018-08-23 DIAGNOSIS — I71.9 AORTIC ANEURYSM OF UNSPECIFIED SITE, WITHOUT RUPTURE: ICD-10-CM

## 2018-08-23 DIAGNOSIS — Z95.0 PRESENCE OF CARDIAC PACEMAKER: ICD-10-CM

## 2018-08-23 DIAGNOSIS — I12.9 HYPERTENSIVE CHRONIC KIDNEY DISEASE WITH STAGE 1 THROUGH STAGE 4 CHRONIC KIDNEY DISEASE, OR UNSPECIFIED CHRONIC KIDNEY DISEASE: ICD-10-CM

## 2018-08-23 DIAGNOSIS — N17.9 ACUTE KIDNEY FAILURE, UNSPECIFIED: ICD-10-CM

## 2018-08-23 DIAGNOSIS — K21.9 GASTRO-ESOPHAGEAL REFLUX DISEASE WITHOUT ESOPHAGITIS: ICD-10-CM

## 2018-08-23 DIAGNOSIS — Z82.49 FAMILY HISTORY OF ISCHEMIC HEART DISEASE AND OTHER DISEASES OF THE CIRCULATORY SYSTEM: ICD-10-CM

## 2018-08-23 DIAGNOSIS — I48.91 UNSPECIFIED ATRIAL FIBRILLATION: ICD-10-CM

## 2018-09-25 ENCOUNTER — APPOINTMENT (OUTPATIENT)
Dept: HEART AND VASCULAR | Facility: CLINIC | Age: 63
End: 2018-09-25
Payer: COMMERCIAL

## 2018-09-25 VITALS
DIASTOLIC BLOOD PRESSURE: 86 MMHG | WEIGHT: 243.02 LBS | SYSTOLIC BLOOD PRESSURE: 116 MMHG | RESPIRATION RATE: 14 BRPM | HEART RATE: 66 BPM | OXYGEN SATURATION: 95 % | TEMPERATURE: 98.6 F | BODY MASS INDEX: 31.19 KG/M2 | HEIGHT: 74 IN

## 2018-09-25 DIAGNOSIS — Z78.9 OTHER SPECIFIED HEALTH STATUS: ICD-10-CM

## 2018-09-25 DIAGNOSIS — Z82.49 FAMILY HISTORY OF ISCHEMIC HEART DISEASE AND OTHER DISEASES OF THE CIRCULATORY SYSTEM: ICD-10-CM

## 2018-09-25 DIAGNOSIS — I71.9 AORTIC ANEURYSM OF UNSPECIFIED SITE, W/OUT RUPTURE: ICD-10-CM

## 2018-09-25 PROCEDURE — 99204 OFFICE O/P NEW MOD 45 MIN: CPT

## 2018-09-25 PROCEDURE — 93000 ELECTROCARDIOGRAM COMPLETE: CPT

## 2018-09-25 RX ORDER — NIFEDIPINE 60 MG/1
60 TABLET, EXTENDED RELEASE ORAL
Refills: 0 | Status: DISCONTINUED | COMMUNITY
Start: 2018-04-04 | End: 2018-09-25

## 2018-09-25 RX ORDER — NIFEDIPINE 30 MG/1
30 TABLET, FILM COATED, EXTENDED RELEASE ORAL
Qty: 30 | Refills: 5 | Status: ACTIVE | COMMUNITY
Start: 2018-09-15

## 2018-09-25 RX ORDER — NIFEDIPINE 30 MG/1
30 TABLET, EXTENDED RELEASE ORAL
Qty: 30 | Refills: 0 | Status: DISCONTINUED | COMMUNITY
Start: 2018-09-06 | End: 2018-09-25

## 2018-09-25 RX ORDER — APIXABAN 5 MG/1
5 TABLET, FILM COATED ORAL
Qty: 60 | Refills: 5 | Status: ACTIVE | COMMUNITY
Start: 2018-09-15

## 2018-09-26 LAB
ALBUMIN SERPL ELPH-MCNC: 4.9 G/DL
ALP BLD-CCNC: 104 U/L
ALT SERPL-CCNC: 24 U/L
ANION GAP SERPL CALC-SCNC: 16 MMOL/L
AST SERPL-CCNC: 25 U/L
BASOPHILS # BLD AUTO: 0 K/UL
BASOPHILS NFR BLD AUTO: 0 %
BILIRUB SERPL-MCNC: 0.5 MG/DL
BUN SERPL-MCNC: 15 MG/DL
CALCIUM SERPL-MCNC: 9.4 MG/DL
CHLORIDE SERPL-SCNC: 100 MMOL/L
CHOLEST SERPL-MCNC: 226 MG/DL
CHOLEST/HDLC SERPL: 4.7 RATIO
CO2 SERPL-SCNC: 28 MMOL/L
CREAT SERPL-MCNC: 1.41 MG/DL
EOSINOPHIL # BLD AUTO: 0.07 K/UL
EOSINOPHIL NFR BLD AUTO: 2.2 %
GLUCOSE SERPL-MCNC: 84 MG/DL
HCT VFR BLD CALC: 46.8 %
HDLC SERPL-MCNC: 48 MG/DL
HGB BLD-MCNC: 15.5 G/DL
IMM GRANULOCYTES NFR BLD AUTO: 0.3 %
LDLC SERPL CALC-MCNC: 160 MG/DL
LYMPHOCYTES # BLD AUTO: 1.73 K/UL
LYMPHOCYTES NFR BLD AUTO: 54.1 %
MAN DIFF?: NORMAL
MCHC RBC-ENTMCNC: 32.2 PG
MCHC RBC-ENTMCNC: 33.1 GM/DL
MCV RBC AUTO: 97.1 FL
MONOCYTES # BLD AUTO: 0.19 K/UL
MONOCYTES NFR BLD AUTO: 5.9 %
NEUTROPHILS # BLD AUTO: 1.2 K/UL
NEUTROPHILS NFR BLD AUTO: 37.5 %
NT-PROBNP SERPL-MCNC: 347 PG/ML
PLATELET # BLD AUTO: 200 K/UL
POTASSIUM SERPL-SCNC: 4.2 MMOL/L
PROT SERPL-MCNC: 8.3 G/DL
RBC # BLD: 4.82 M/UL
RBC # FLD: 14 %
SODIUM SERPL-SCNC: 143 MMOL/L
TRIGL SERPL-MCNC: 88 MG/DL
TSH SERPL-ACNC: 1.03 UIU/ML
WBC # FLD AUTO: 3.2 K/UL

## 2018-10-03 ENCOUNTER — APPOINTMENT (OUTPATIENT)
Dept: HEART AND VASCULAR | Facility: CLINIC | Age: 63
End: 2018-10-03
Payer: COMMERCIAL

## 2018-10-03 VITALS
BODY MASS INDEX: 31.32 KG/M2 | WEIGHT: 244 LBS | HEIGHT: 74 IN | DIASTOLIC BLOOD PRESSURE: 89 MMHG | SYSTOLIC BLOOD PRESSURE: 138 MMHG | HEART RATE: 69 BPM

## 2018-10-03 PROCEDURE — 93280 PM DEVICE PROGR EVAL DUAL: CPT

## 2018-10-12 ENCOUNTER — APPOINTMENT (OUTPATIENT)
Dept: PULMONOLOGY | Facility: CLINIC | Age: 63
End: 2018-10-12
Payer: COMMERCIAL

## 2018-10-12 VITALS
WEIGHT: 245 LBS | TEMPERATURE: 97.3 F | DIASTOLIC BLOOD PRESSURE: 80 MMHG | SYSTOLIC BLOOD PRESSURE: 114 MMHG | OXYGEN SATURATION: 98 % | HEIGHT: 74 IN | HEART RATE: 66 BPM | BODY MASS INDEX: 31.44 KG/M2

## 2018-10-12 DIAGNOSIS — R91.8 OTHER NONSPECIFIC ABNORMAL FINDING OF LUNG FIELD: ICD-10-CM

## 2018-10-12 PROCEDURE — 99245 OFF/OP CONSLTJ NEW/EST HI 55: CPT | Mod: 25

## 2018-10-12 PROCEDURE — 95012 NITRIC OXIDE EXP GAS DETER: CPT

## 2018-10-12 PROCEDURE — 94010 BREATHING CAPACITY TEST: CPT

## 2018-10-12 RX ORDER — SILDENAFIL 100 MG/1
100 TABLET, FILM COATED ORAL
Qty: 12 | Refills: 0 | Status: DISCONTINUED | COMMUNITY
Start: 2018-09-07 | End: 2018-10-12

## 2018-10-17 ENCOUNTER — APPOINTMENT (OUTPATIENT)
Dept: PULMONOLOGY | Facility: CLINIC | Age: 63
End: 2018-10-17
Payer: COMMERCIAL

## 2018-10-17 PROCEDURE — 94729 DIFFUSING CAPACITY: CPT

## 2018-10-17 PROCEDURE — 94727 GAS DIL/WSHOT DETER LNG VOL: CPT

## 2018-10-17 PROCEDURE — 94060 EVALUATION OF WHEEZING: CPT

## 2018-10-17 RX ORDER — FLUTICASONE PROPIONATE 50 UG/1
50 SPRAY, METERED NASAL DAILY
Qty: 1 | Refills: 3 | Status: ACTIVE | COMMUNITY
Start: 2018-10-17 | End: 1900-01-01

## 2018-11-14 ENCOUNTER — APPOINTMENT (OUTPATIENT)
Dept: HEART AND VASCULAR | Facility: CLINIC | Age: 63
End: 2018-11-14
Payer: COMMERCIAL

## 2018-11-14 VITALS
WEIGHT: 242 LBS | BODY MASS INDEX: 31.06 KG/M2 | HEART RATE: 72 BPM | DIASTOLIC BLOOD PRESSURE: 91 MMHG | SYSTOLIC BLOOD PRESSURE: 139 MMHG | HEIGHT: 74 IN

## 2018-11-14 PROCEDURE — 93280 PM DEVICE PROGR EVAL DUAL: CPT

## 2018-11-14 PROCEDURE — 99213 OFFICE O/P EST LOW 20 MIN: CPT | Mod: 25

## 2018-11-18 ENCOUNTER — EMERGENCY (EMERGENCY)
Facility: HOSPITAL | Age: 63
LOS: 1 days | Discharge: HOME | End: 2018-11-18
Attending: EMERGENCY MEDICINE | Admitting: EMERGENCY MEDICINE
Payer: COMMERCIAL

## 2018-11-18 VITALS
RESPIRATION RATE: 18 BRPM | OXYGEN SATURATION: 100 % | SYSTOLIC BLOOD PRESSURE: 154 MMHG | HEART RATE: 73 BPM | DIASTOLIC BLOOD PRESSURE: 112 MMHG | TEMPERATURE: 98 F

## 2018-11-18 DIAGNOSIS — Z95.0 PRESENCE OF CARDIAC PACEMAKER: Chronic | ICD-10-CM

## 2018-11-18 DIAGNOSIS — R04.2 HEMOPTYSIS: ICD-10-CM

## 2018-11-18 DIAGNOSIS — E78.00 PURE HYPERCHOLESTEROLEMIA, UNSPECIFIED: ICD-10-CM

## 2018-11-18 DIAGNOSIS — Z79.899 OTHER LONG TERM (CURRENT) DRUG THERAPY: ICD-10-CM

## 2018-11-18 DIAGNOSIS — N18.9 CHRONIC KIDNEY DISEASE, UNSPECIFIED: ICD-10-CM

## 2018-11-18 DIAGNOSIS — E78.5 HYPERLIPIDEMIA, UNSPECIFIED: ICD-10-CM

## 2018-11-18 DIAGNOSIS — Z87.891 PERSONAL HISTORY OF NICOTINE DEPENDENCE: ICD-10-CM

## 2018-11-18 DIAGNOSIS — I12.9 HYPERTENSIVE CHRONIC KIDNEY DISEASE WITH STAGE 1 THROUGH STAGE 4 CHRONIC KIDNEY DISEASE, OR UNSPECIFIED CHRONIC KIDNEY DISEASE: ICD-10-CM

## 2018-11-18 PROCEDURE — 99284 EMERGENCY DEPT VISIT MOD MDM: CPT | Mod: 25

## 2018-11-18 PROCEDURE — 93010 ELECTROCARDIOGRAM REPORT: CPT

## 2018-11-18 NOTE — ED ADULT TRIAGE NOTE - ARRIVAL INFO ADDITIONAL COMMENTS
pt c/o productive cough with intermittent blood for several months.  was worked up 2 months ago and dx with PE.  has also seen a pulmonologist.

## 2018-11-19 VITALS
OXYGEN SATURATION: 100 % | SYSTOLIC BLOOD PRESSURE: 155 MMHG | RESPIRATION RATE: 18 BRPM | TEMPERATURE: 98 F | DIASTOLIC BLOOD PRESSURE: 93 MMHG | HEART RATE: 66 BPM

## 2018-11-19 PROCEDURE — 71250 CT THORAX DX C-: CPT | Mod: 26

## 2018-11-19 PROCEDURE — 99284 EMERGENCY DEPT VISIT MOD MDM: CPT | Mod: 25

## 2018-11-19 PROCEDURE — 86900 BLOOD TYPING SEROLOGIC ABO: CPT

## 2018-11-19 PROCEDURE — 93005 ELECTROCARDIOGRAM TRACING: CPT

## 2018-11-19 PROCEDURE — 86850 RBC ANTIBODY SCREEN: CPT

## 2018-11-19 PROCEDURE — 80053 COMPREHEN METABOLIC PANEL: CPT

## 2018-11-19 PROCEDURE — 86901 BLOOD TYPING SEROLOGIC RH(D): CPT

## 2018-11-19 PROCEDURE — 36415 COLL VENOUS BLD VENIPUNCTURE: CPT

## 2018-11-19 PROCEDURE — 85730 THROMBOPLASTIN TIME PARTIAL: CPT

## 2018-11-19 PROCEDURE — 71046 X-RAY EXAM CHEST 2 VIEWS: CPT | Mod: 26

## 2018-11-19 PROCEDURE — 71046 X-RAY EXAM CHEST 2 VIEWS: CPT

## 2018-11-19 PROCEDURE — 85025 COMPLETE CBC W/AUTO DIFF WBC: CPT

## 2018-11-19 PROCEDURE — 85610 PROTHROMBIN TIME: CPT

## 2018-11-19 PROCEDURE — 71250 CT THORAX DX C-: CPT

## 2018-11-19 NOTE — ED PROVIDER NOTE - MEDICAL DECISION MAKING DETAILS
Pt with blood tinged sputum x months, followed by pulm and ENT. No resp distress, no CP, no dizziness or syncope. CT performed and shows ground glass opacities. Given lack of fever and no WBC, will hold off on abx. He was advised to f/u with pulm again.   The patient is stable for DC. They were advised to call their PMD for prompt outpatient follow up. Return precautions were discussed. The patient was advised to return to the ER for any concerning or worsening symptoms.

## 2018-11-19 NOTE — ED ADULT NURSE NOTE - OBJECTIVE STATEMENT
64 y/o male with hx of Afib on eliquis, HTN, high cholesterol, GERD arrived to [facility] ER reporting coughing up blood clots since last August. Pt verbalized that he was dx with a PE last august and started treatment however he still coughs up clots and was recently evaluated and cleared from ENT. Pt verbalized that he suppose to have a follow up CT scan this upcoming January but he doesn't want to wait. Upon assessment, abdomen soft, lung fields WNL, breathing is equal and unlabored, pulses palpable. Pt denies chest pain, headache, dizziness, blurred vision, slurred speech, nausea,, vomiting, diarrhea, fever, chills, LOC, SOB, weakness, fatigue, and palpitations. Care in progress.

## 2018-11-19 NOTE — ED PROVIDER NOTE - OBJECTIVE STATEMENT
62M former 75 pack-year smoker with PMHx of Afib s/p PPM (not on AC), HTN, HLD, CKD, nephrolithiasis, PE 62M former 75 pack-year smoker with PMHx of Afib s/p PPM (not on AC), HTN, HLD, CKD, nephrolithiasis, PE who p/w blood tingled sputum x 4 days. Follows with pulm and ENT  due for CT scan in January but  No SOB/ CP 62M former 75 pack-year smoker with PMHx of Afib s/p PPM (not on AC), HTN, HLD, CKD, nephrolithiasis, PE who p/w blood tingled sputum x 4 days. Follows with pulm and ENT, he has been scoped by ENT with no source of blood tinged sputum found. He had a CT which Dr. Pillai reviewed in the past and stated need no intervention. He is due for CT scan in January but did not want to wait bc he still has some flecks of brown and red in his sputum. No fevers, No SOB/ CP, no dizziness or syncope, no other complaints. 62M former 75 pack-year smoker with PMHx of Afib s/p PPM (not on AC), HTN, HLD, CKD, nephrolithiasis, PE who p/w blood tingled sputum x 4 days. Follows with pulm and ENT, he has been scoped by ENT with no source of blood tinged sputum found. He had a CT which Dr. Pillai reviewed in the past and stated need no intervention. He is due for CT scan in January but did not want to wait bc he still has some flecks of brown and red in his sputum. No fevers, No SOB/ CP, no dizziness or syncope, no other complaints. no hx of TB or recent travel.

## 2018-11-20 RX ORDER — AZITHROMYCIN 500 MG/1
1 TABLET, FILM COATED ORAL
Qty: 6 | Refills: 0 | OUTPATIENT
Start: 2018-11-20

## 2018-11-20 NOTE — ED POST DISCHARGE NOTE - RESULT SUMMARY
Pt was called  and RX left at pharmacy in case of worsening sx or development of fever given findings of CXR and CT, which were nonspecific, but which could be due to developing infection/pna. Left message with patient.

## 2018-11-20 NOTE — ED POST DISCHARGE NOTE - ADDITIONAL DOCUMENTATION
patient called asking for script to a different pharamcy and z pack sent to [facility] as requested (Stressed f/u  care with pmd )

## 2018-11-21 RX ORDER — AZITHROMYCIN 500 MG/1
1 TABLET, FILM COATED ORAL
Qty: 6 | Refills: 0 | OUTPATIENT
Start: 2018-11-21

## 2018-11-21 NOTE — DISCUSSION/SUMMARY
[Pacemaker Function Normal] : normal pacemaker function [FreeTextEntry1] : Mr. 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 . Pacemaker interrogation reveals normal function.  All measured data is within normal limits.  No events for review.  He has been persistently in atrial fibrillation since at least 2015 and denies any attempt to restore NSR.  He is now on Eliquis for a PE and is tolerating this without issues.  I have offered him to proceed with a NGUYEN/cardioversion and he would like to postpone this until after the new year secondary to family obligations.  GIven his persistent afib he will need to take amiodarone prior to his cardioversion to increase the chances he will maintain NSR.  We discussed that if he notes an improvement in the way he feels in normal sinus rhythm we can discuss more long term options for rhythm management; however if he does not notice an improvement we will let him remain in rate controlled atrial fibrillation.  He will follow up in 2 months or sooner if needed.  He knows to call with any questions or concerns.

## 2018-11-21 NOTE — PROCEDURE
[No] : not [Atrial Fibrillation] : atrial fibrillation [Pacemaker] : pacemaker [DDD] : DDD [Threshold Testing Performed] : Threshold testing was performed [Lead Imp:  ___ohms] : lead impedance was [unfilled] ohms [Sensing Amplitude ___mv] : sensing amplitude was [unfilled] mv [___V @] : [unfilled] V [___ ms] : [unfilled] ms [Asense-Vsense ___ %] : Asense-Vsense [unfilled]% [Asense-Vpace ___ %] : Asense-Vpace [unfilled]% [Apace-Vsense ___ %] : Apace-Vsense [unfilled]% [Apace-Vpace ___ %] : Apace-Vpace [unfilled]% [de-identified] : Medtronic [de-identified] : Margarita [de-identified] : [id number] [de-identified] : 5/2015 [de-identified] : [id number] [de-identified] : 6 years  [de-identified] : changed to VVIR [de-identified] : afib burden 100%

## 2018-11-21 NOTE — PHYSICAL EXAM
[General Appearance - Well Developed] : well developed [Normal Appearance] : normal appearance [Well Groomed] : well groomed [General Appearance - Well Nourished] : well nourished [No Deformities] : no deformities [General Appearance - In No Acute Distress] : no acute distress [] : no respiratory distress [Respiration, Rhythm And Depth] : normal respiratory rhythm and effort [Exaggerated Use Of Accessory Muscles For Inspiration] : no accessory muscle use [Left Infraclavicular] : left infraclavicular area [Clean] : clean [Dry] : dry [Well-Healed] : well-healed [Cyanosis, Localized] : no localized cyanosis [5th Left ICS - MCL] : palpated at the 5th LICS in the midclavicular line [No Precordial Heave] : no precordial heave was noted [Normal Rate] : normal [Irregularly Irregular] : irregularly irregular [No Pitting Edema] : no pitting edema present [Palpable Crepitus] : no palpable crepitus [Bleeding] : no active bleeding [Foul Odor] : no foul smell [Purulent Drainage] : no purulent drainage [Serosanguineous Drainage] : no serosanquineous drainage [Serous Drainage] : no serous drainage [Erythema] : not erythematous [Warm] : not warm [Tender] : not tender [Indurated] : not indurated [Fluctuant] : not fluctuant

## 2018-11-21 NOTE — HISTORY OF PRESENT ILLNESS
[Palpitations] : no palpitations [SOB] : no dyspnea [Syncope] : no syncope [Dizziness] : no dizziness [Chest Pain] : no chest pain or discomfort [Shoulder Pain] : no shoulder pain [Pain at Site] : no pain at device site [Erythema at Site] : no erythema at device site [Swelling at Site] : no swelling at device site [de-identified] : Mr. Mills is a 63 year old male 75 pack-year smoker with HTN, HLD,  Afib (not on anticoagulation) with PPM (secondary to bradycardia) and PE (now on Eliquis) who presents for follow up.\par \par He had a pacemaker placed at an OSH reportedly for bradycardia.  He is persistently in atrial fibrillation and refuses anticoagulation (CHADS score 1 for HTN).  He was seen in the hospital after a syncopal episode with a prodrome of nausea and vomiting.  He notes 5 prior episodes like this.   Device interrogation revealed no abnormalities and this was felt to be neurocardiogenic in nature and he was offered a tilt table test which he refused.  Stress test on that admission revealed no ischemia.  Echo showed LVEF 45-50%   \par \par Over the summer he was admitted to [facility] with a  PE and is now on Eliquis.  \par \par Overall he is feeling well.  He notes some fatigue but denies any chest pain, SOB, syncope, near syncope, orthopnea, PND. He denies ever undergoing an ablation or cardioversion or taking an antiarrhythmic and states he has been in atrial fibrillation since at least 2015.  No device related complaints.  \par \par  \par  \par

## 2018-11-21 NOTE — REVIEW OF SYSTEMS
[see HPI] : see HPI [Negative] : Heme/Lymph [Feeling Fatigued] : feeling fatigued [Fever] : no fever [Headache] : no headache [Chills] : no chills [Cough] : no cough [Wheezing] : no wheezing

## 2018-11-21 NOTE — HISTORY OF PRESENT ILLNESS
[Palpitations] : no palpitations [SOB] : no dyspnea [Syncope] : no syncope [Dizziness] : no dizziness [Chest Pain] : no chest pain or discomfort [Shoulder Pain] : no shoulder pain [Pain at Site] : no pain at device site [Erythema at Site] : no erythema at device site [Swelling at Site] : no swelling at device site [de-identified] : Mr. Mills is a 63 year old male 75 pack-year smoker with HTN, HLD,  Afib (not on anticoagulation) with PPM (secondary to bradycardia) and PE (now on Eliquis) who presents for follow up.\par \par He had a pacemaker placed at an OSH reportedly for bradycardia.  He is persistently in atrial fibrillation and refuses anticoagulation (CHADS score 1 for HTN).  He was seen in the hospital after a syncopal episode with a prodrome of nausea and vomiting.  He notes 5 prior episodes like this.   Device interrogation revealed no abnormalities and this was felt to be neurocardiogenic in nature and he was offered a tilt table test which he refused.  Stress test on that admission revealed no ischemia.  Echo showed LVEF 45-50%   \par \par Over the summer he was admitted to [facility] with a  PE and is now on Eliquis.  \par \par Overall he is feeling well.  He notes some fatigue but denies any chest pain, SOB, syncope, near syncope, orthopnea, PND. He denies ever undergoing an ablation or cardioversion or taking an antiarrhythmic and states he has been in atrial fibrillation since at least 2015.  No device related complaints.  \par \par  \par  \par

## 2018-11-21 NOTE — PROCEDURE
[No] : not [Atrial Fibrillation] : atrial fibrillation [Pacemaker] : pacemaker [DDD] : DDD [Threshold Testing Performed] : Threshold testing was performed [Lead Imp:  ___ohms] : lead impedance was [unfilled] ohms [Sensing Amplitude ___mv] : sensing amplitude was [unfilled] mv [___V @] : [unfilled] V [___ ms] : [unfilled] ms [Asense-Vsense ___ %] : Asense-Vsense [unfilled]% [Asense-Vpace ___ %] : Asense-Vpace [unfilled]% [Apace-Vsense ___ %] : Apace-Vsense [unfilled]% [Apace-Vpace ___ %] : Apace-Vpace [unfilled]% [de-identified] : Medtronic [de-identified] : Margarita [de-identified] : [id number] [de-identified] : 5/2015 [de-identified] : [id number] [de-identified] : 6 years  [de-identified] : changed to VVIR [de-identified] : afib burden 100%

## 2018-11-29 ENCOUNTER — APPOINTMENT (OUTPATIENT)
Dept: HEART AND VASCULAR | Facility: CLINIC | Age: 63
End: 2018-11-29
Payer: COMMERCIAL

## 2018-11-29 VITALS
OXYGEN SATURATION: 97 % | SYSTOLIC BLOOD PRESSURE: 112 MMHG | BODY MASS INDEX: 31.83 KG/M2 | DIASTOLIC BLOOD PRESSURE: 88 MMHG | TEMPERATURE: 98.2 F | HEIGHT: 74 IN | WEIGHT: 248.03 LBS | RESPIRATION RATE: 14 BRPM | HEART RATE: 71 BPM

## 2018-11-29 DIAGNOSIS — I10 ESSENTIAL (PRIMARY) HYPERTENSION: ICD-10-CM

## 2018-11-29 DIAGNOSIS — J44.9 CHRONIC OBSTRUCTIVE PULMONARY DISEASE, UNSPECIFIED: ICD-10-CM

## 2018-11-29 PROCEDURE — 99214 OFFICE O/P EST MOD 30 MIN: CPT

## 2018-11-29 NOTE — PHYSICAL EXAM
[General Appearance - Well Developed] : well developed [Normal Appearance] : normal appearance [Well Groomed] : well groomed [General Appearance - Well Nourished] : well nourished [No Deformities] : no deformities [General Appearance - In No Acute Distress] : no acute distress [Normal Conjunctiva] : the conjunctiva exhibited no abnormalities [] : no respiratory distress [Respiration, Rhythm And Depth] : normal respiratory rhythm and effort [Exaggerated Use Of Accessory Muscles For Inspiration] : no accessory muscle use [Auscultation Breath Sounds / Voice Sounds] : lungs were clear to auscultation bilaterally [Heart Sounds] : normal S1 and S2 [Abdomen Soft] : soft [Abnormal Walk] : normal gait [FreeTextEntry1] : no edema [Skin Turgor] : normal skin turgor [Oriented To Time, Place, And Person] : oriented to person, place, and time [Affect] : the affect was normal [Mood] : the mood was normal [No Anxiety] : not feeling anxious

## 2018-11-29 NOTE — DISCUSSION/SUMMARY
[FreeTextEntry1] : TIP Landaverde, PPM, PE--I encouraged him to proceed with DCCV. I suggested Pulmonary follow-up

## 2018-11-29 NOTE — HISTORY OF PRESENT ILLNESS
[FreeTextEntry1] : 63 year male who went to see ENT who told him that his bleeding was not related to his ENT. He was back in ER at [facility] and had a CT with hemoptysis. He was evaluated with CXR and CT. A LLL worsening infiltrate was seen. His RLL improved. He was placed on Zithromax and his bleeding stopped. He is planning to continue with Dr Garza and Dr Martinez but plans to see Dr Carrillo of Pulmonary at [facility] for an additional opinion. He saw Dr Glenna Rodriguez of Nephrology yesterday. He denies having any chest pain or palpitations.

## 2018-11-30 ENCOUNTER — MOBILE ON CALL (OUTPATIENT)
Age: 63
End: 2018-11-30

## 2018-12-05 NOTE — DISCHARGE NOTE ADULT - MEDICATION SUMMARY - MEDICATIONS TO CHANGE
Called and left a message to call back for results   I will SWITCH the dose or number of times a day I take the medications listed below when I get home from the hospital:  None

## 2018-12-11 ENCOUNTER — APPOINTMENT (OUTPATIENT)
Dept: PULMONOLOGY | Facility: CLINIC | Age: 63
End: 2018-12-11

## 2019-01-24 ENCOUNTER — APPOINTMENT (OUTPATIENT)
Dept: HEART AND VASCULAR | Facility: CLINIC | Age: 64
End: 2019-01-24
Payer: MEDICARE

## 2019-02-06 ENCOUNTER — APPOINTMENT (OUTPATIENT)
Dept: HEART AND VASCULAR | Facility: CLINIC | Age: 64
End: 2019-02-06
Payer: MEDICARE

## 2019-02-06 VITALS
HEIGHT: 74 IN | HEART RATE: 65 BPM | SYSTOLIC BLOOD PRESSURE: 123 MMHG | BODY MASS INDEX: 32.34 KG/M2 | WEIGHT: 252 LBS | DIASTOLIC BLOOD PRESSURE: 78 MMHG

## 2019-02-06 PROCEDURE — 99214 OFFICE O/P EST MOD 30 MIN: CPT | Mod: 25

## 2019-02-06 PROCEDURE — 93280 PM DEVICE PROGR EVAL DUAL: CPT

## 2019-02-13 NOTE — PROCEDURE
[No] : not [Atrial Fibrillation] : atrial fibrillation [Pacemaker] : pacemaker [Threshold Testing Performed] : Threshold testing was performed [Lead Imp:  ___ohms] : lead impedance was [unfilled] ohms [Sensing Amplitude ___mv] : sensing amplitude was [unfilled] mv [___V @] : [unfilled] V [___ ms] : [unfilled] ms [VVIR] : VVIR [None] : none [de-identified] : Medtronic [de-identified] : Margarita [de-identified] : [id number] [de-identified] : 5/2015 [de-identified] : [id number] [de-identified] : 7 years  [de-identified] : b [de-identified] : afib burden 100%\par  94.7%

## 2019-02-13 NOTE — REVIEW OF SYSTEMS
[Negative] : Heme/Lymph [see HPI] : see HPI [Fever] : no fever [Headache] : no headache [Recent Weight Gain (___ Lbs)] : no recent weight gain [Chills] : no chills [Recent Weight Loss (___ Lbs)] : no recent weight loss [Cough] : no cough [Wheezing] : no wheezing

## 2019-02-13 NOTE — HISTORY OF PRESENT ILLNESS
[Palpitations] : no palpitations [SOB] : no dyspnea [Syncope] : no syncope [Dizziness] : no dizziness [Chest Pain] : no chest pain or discomfort [Shoulder Pain] : no shoulder pain [Pain at Site] : no pain at device site [Erythema at Site] : no erythema at device site [Swelling at Site] : no swelling at device site [de-identified] : Mr. Mills is a 63 year old male 75 pack-year smoker with HTN, HLD,  Afib (not on anticoagulation) with PPM (secondary to bradycardia) and PE (now on Eliquis) who presents for follow up.\par \par He had a pacemaker placed at an OSH reportedly for bradycardia.  He is persistently in atrial fibrillation and refuses anticoagulation (CHADS score 1 for HTN).  He was seen in the hospital after a syncopal episode with a prodrome of nausea and vomiting.  He notes 5 prior episodes like this.   Device interrogation revealed no abnormalities and this was felt to be neurocardiogenic in nature and he was offered a tilt table test which he refused.  Stress test on that admission revealed no ischemia.  Echo showed LVEF 45-50%   \par \par Over the summer he was admitted to [facility] with a  PE and is now on Eliquis.  \par \par Overall he is feeling well.  He states since his last visit he was admitted to the ER with hemoptysis and has remained on Eliquis.  This has led him to get a new pulmonologist at [facility], who has has follow up with next week.  He notes some fatigue but denies any chest pain, SOB, syncope, near syncope, orthopnea, PND. He denies ever undergoing an ablation or cardioversion or taking an antiarrhythmic and states he has been in atrial fibrillation since at least 2015.  No device related complaints.  \par

## 2019-02-13 NOTE — PHYSICAL EXAM
[General Appearance - Well Developed] : well developed [Normal Appearance] : normal appearance [Well Groomed] : well groomed [General Appearance - Well Nourished] : well nourished [No Deformities] : no deformities [General Appearance - In No Acute Distress] : no acute distress [Respiration, Rhythm And Depth] : normal respiratory rhythm and effort [Exaggerated Use Of Accessory Muscles For Inspiration] : no accessory muscle use [Left Infraclavicular] : left infraclavicular area [Clean] : clean [Dry] : dry [Well-Healed] : well-healed [5th Left ICS - MCL] : palpated at the 5th LICS in the midclavicular line [No Precordial Heave] : no precordial heave was noted [Normal Rate] : normal [Irregularly Irregular] : irregularly irregular [No Pitting Edema] : no pitting edema present [] : no ischemic changes [Palpable Crepitus] : no palpable crepitus [Bleeding] : no active bleeding [Foul Odor] : no foul smell [Purulent Drainage] : no purulent drainage [Serosanguineous Drainage] : no serosanquineous drainage [Serous Drainage] : no serous drainage [Erythema] : not erythematous [Warm] : not warm [Tender] : not tender [Indurated] : not indurated [Fluctuant] : not fluctuant

## 2019-02-13 NOTE — DISCUSSION/SUMMARY
[Pacemaker Function Normal] : normal pacemaker function [FreeTextEntry1] : Mr. 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 . Pacemaker interrogation reveals normal function.  All measured data is within normal limits.  No events for review.  He has been persistently in atrial fibrillation since at least 2015 and denies any attempt to restore NSR.  He is now on Eliquis for a PE and is tolerating this without issues.  I have offered him to proceed with a NGUYEN/cardioversion and he would like to postpone this until after he follows up with the pulmonologist to get their input.   GIven his persistent afib he will need to take amiodarone prior to his cardioversion to increase the chances he will maintain NSR.  We discussed that if he notes an improvement in the way he feels in normal sinus rhythm we can discuss more long term options for rhythm management; however if he does not notice an improvement we will let him remain in rate controlled atrial fibrillation.  He will follow up in 2 months or sooner if needed.  He knows to call with any questions or concerns.

## 2019-03-27 ENCOUNTER — APPOINTMENT (OUTPATIENT)
Dept: HEART AND VASCULAR | Facility: CLINIC | Age: 64
End: 2019-03-27

## 2019-04-08 ENCOUNTER — APPOINTMENT (OUTPATIENT)
Dept: HEART AND VASCULAR | Facility: CLINIC | Age: 64
End: 2019-04-08
Payer: MEDICARE

## 2019-04-08 VITALS
WEIGHT: 253 LBS | SYSTOLIC BLOOD PRESSURE: 128 MMHG | RESPIRATION RATE: 14 BRPM | HEART RATE: 76 BPM | DIASTOLIC BLOOD PRESSURE: 78 MMHG | TEMPERATURE: 97.8 F | BODY MASS INDEX: 32.47 KG/M2 | OXYGEN SATURATION: 97 % | HEIGHT: 74 IN

## 2019-04-08 DIAGNOSIS — I26.99 OTHER PULMONARY EMBOLISM W/OUT ACUTE COR PULMONALE: ICD-10-CM

## 2019-04-08 DIAGNOSIS — I48.1 PERSISTENT ATRIAL FIBRILLATION: ICD-10-CM

## 2019-04-08 PROCEDURE — 99214 OFFICE O/P EST MOD 30 MIN: CPT

## 2019-04-08 NOTE — DISCUSSION/SUMMARY
[FreeTextEntry1] : PPM, hx of PE, A. Fib--I informed him that I believe he should not stop Eliquis given his PE and A. Fib. I urged him to get confirmation of the safety to restart his Eliquis from Pulmonary and start it ASAP given risk of CVA and PE

## 2019-04-08 NOTE — HISTORY OF PRESENT ILLNESS
[FreeTextEntry1] : 63 year male who was seen at [facility], Dr Mcclelland, who reviewed his CT scans and X-Rays. He was told her needs a lung biopsy with Dr Schwartz under bronchoscopy. He then had Bronchoscopy with Dr Schwartz with washing without a biopsy. He had been on Garlic which he believes helped him stop bleeding along with a Z-Pack from ER. He is awaiting bronchoscopy washing results. He has not had any further bleeding. He missed his appointment with Dr Cruz. He plans to see EP at [facility], Dr Damian Landeros, who placed his PPM on May 10. He plans to see Dr Granados in [place]. He stopped Eliquis on his own believing he is OK on Garlic alone. He refused an EKG today

## 2019-07-15 ENCOUNTER — APPOINTMENT (OUTPATIENT)
Dept: HEART AND VASCULAR | Facility: CLINIC | Age: 64
End: 2019-07-15
Payer: MEDICARE

## 2019-07-15 PROCEDURE — 93923 UPR/LXTR ART STDY 3+ LVLS: CPT

## 2019-10-14 ENCOUNTER — APPOINTMENT (OUTPATIENT)
Dept: HEART AND VASCULAR | Facility: CLINIC | Age: 64
End: 2019-10-14

## 2019-12-06 NOTE — H&P ADULT - PSYCHIATRIC
Patient continues to progress towards goals without complication. negative Affect and characteristics of appearance, verbalizations, behaviors are appropriate

## 2020-03-02 ENCOUNTER — EMERGENCY (EMERGENCY)
Facility: HOSPITAL | Age: 65
LOS: 1 days | Discharge: HOME | End: 2020-03-02
Attending: EMERGENCY MEDICINE | Admitting: EMERGENCY MEDICINE
Payer: MEDICARE

## 2020-03-02 VITALS
RESPIRATION RATE: 16 BRPM | OXYGEN SATURATION: 96 % | SYSTOLIC BLOOD PRESSURE: 151 MMHG | DIASTOLIC BLOOD PRESSURE: 93 MMHG | HEART RATE: 65 BPM | TEMPERATURE: 98 F

## 2020-03-02 VITALS
DIASTOLIC BLOOD PRESSURE: 92 MMHG | RESPIRATION RATE: 18 BRPM | OXYGEN SATURATION: 97 % | SYSTOLIC BLOOD PRESSURE: 168 MMHG | WEIGHT: 263.45 LBS | HEART RATE: 72 BPM | HEIGHT: 74 IN | TEMPERATURE: 98 F

## 2020-03-02 DIAGNOSIS — Z95.0 PRESENCE OF CARDIAC PACEMAKER: Chronic | ICD-10-CM

## 2020-03-02 LAB
ALBUMIN SERPL ELPH-MCNC: 4 G/DL — SIGNIFICANT CHANGE UP (ref 3.3–5)
ALP SERPL-CCNC: 84 U/L — SIGNIFICANT CHANGE UP (ref 40–120)
ALT FLD-CCNC: 30 U/L — SIGNIFICANT CHANGE UP (ref 10–45)
ANION GAP SERPL CALC-SCNC: 11 MMOL/L — SIGNIFICANT CHANGE UP (ref 5–17)
APPEARANCE UR: CLEAR — SIGNIFICANT CHANGE UP
AST SERPL-CCNC: 30 U/L — SIGNIFICANT CHANGE UP (ref 10–40)
BASOPHILS # BLD AUTO: 0.01 K/UL — SIGNIFICANT CHANGE UP (ref 0–0.2)
BASOPHILS NFR BLD AUTO: 0.2 % — SIGNIFICANT CHANGE UP (ref 0–2)
BILIRUB SERPL-MCNC: 0.3 MG/DL — SIGNIFICANT CHANGE UP (ref 0.2–1.2)
BILIRUB UR-MCNC: NEGATIVE — SIGNIFICANT CHANGE UP
BUN SERPL-MCNC: 19 MG/DL — SIGNIFICANT CHANGE UP (ref 7–23)
CALCIUM SERPL-MCNC: 8.6 MG/DL — SIGNIFICANT CHANGE UP (ref 8.4–10.5)
CHLORIDE SERPL-SCNC: 102 MMOL/L — SIGNIFICANT CHANGE UP (ref 96–108)
CO2 SERPL-SCNC: 25 MMOL/L — SIGNIFICANT CHANGE UP (ref 22–31)
COLOR SPEC: YELLOW — SIGNIFICANT CHANGE UP
CREAT SERPL-MCNC: 1.33 MG/DL — HIGH (ref 0.5–1.3)
DIFF PNL FLD: NEGATIVE — SIGNIFICANT CHANGE UP
EOSINOPHIL # BLD AUTO: 0.04 K/UL — SIGNIFICANT CHANGE UP (ref 0–0.5)
EOSINOPHIL NFR BLD AUTO: 0.8 % — SIGNIFICANT CHANGE UP (ref 0–6)
FLU A RESULT: SIGNIFICANT CHANGE UP
FLU A RESULT: SIGNIFICANT CHANGE UP
FLUAV AG NPH QL: SIGNIFICANT CHANGE UP
FLUBV AG NPH QL: SIGNIFICANT CHANGE UP
GLUCOSE SERPL-MCNC: 101 MG/DL — HIGH (ref 70–99)
GLUCOSE UR QL: NEGATIVE — SIGNIFICANT CHANGE UP
HCT VFR BLD CALC: 42.2 % — SIGNIFICANT CHANGE UP (ref 39–50)
HGB BLD-MCNC: 13.8 G/DL — SIGNIFICANT CHANGE UP (ref 13–17)
IMM GRANULOCYTES NFR BLD AUTO: 0.4 % — SIGNIFICANT CHANGE UP (ref 0–1.5)
KETONES UR-MCNC: NEGATIVE — SIGNIFICANT CHANGE UP
LEUKOCYTE ESTERASE UR-ACNC: NEGATIVE — SIGNIFICANT CHANGE UP
LYMPHOCYTES # BLD AUTO: 1.52 K/UL — SIGNIFICANT CHANGE UP (ref 1–3.3)
LYMPHOCYTES # BLD AUTO: 30.7 % — SIGNIFICANT CHANGE UP (ref 13–44)
MCHC RBC-ENTMCNC: 31.7 PG — SIGNIFICANT CHANGE UP (ref 27–34)
MCHC RBC-ENTMCNC: 32.7 GM/DL — SIGNIFICANT CHANGE UP (ref 32–36)
MCV RBC AUTO: 97 FL — SIGNIFICANT CHANGE UP (ref 80–100)
MONOCYTES # BLD AUTO: 0.55 K/UL — SIGNIFICANT CHANGE UP (ref 0–0.9)
MONOCYTES NFR BLD AUTO: 11.1 % — SIGNIFICANT CHANGE UP (ref 2–14)
NEUTROPHILS # BLD AUTO: 2.81 K/UL — SIGNIFICANT CHANGE UP (ref 1.8–7.4)
NEUTROPHILS NFR BLD AUTO: 56.8 % — SIGNIFICANT CHANGE UP (ref 43–77)
NITRITE UR-MCNC: NEGATIVE — SIGNIFICANT CHANGE UP
NRBC # BLD: 0 /100 WBCS — SIGNIFICANT CHANGE UP (ref 0–0)
PH UR: 6 — SIGNIFICANT CHANGE UP (ref 5–8)
PLATELET # BLD AUTO: 193 K/UL — SIGNIFICANT CHANGE UP (ref 150–400)
POTASSIUM SERPL-MCNC: 4.5 MMOL/L — SIGNIFICANT CHANGE UP (ref 3.5–5.3)
POTASSIUM SERPL-SCNC: 4.5 MMOL/L — SIGNIFICANT CHANGE UP (ref 3.5–5.3)
PROT SERPL-MCNC: 7.2 G/DL — SIGNIFICANT CHANGE UP (ref 6–8.3)
PROT UR-MCNC: NEGATIVE MG/DL — SIGNIFICANT CHANGE UP
RBC # BLD: 4.35 M/UL — SIGNIFICANT CHANGE UP (ref 4.2–5.8)
RBC # FLD: 12.9 % — SIGNIFICANT CHANGE UP (ref 10.3–14.5)
RSV RESULT: SIGNIFICANT CHANGE UP
RSV RNA RESP QL NAA+PROBE: SIGNIFICANT CHANGE UP
SODIUM SERPL-SCNC: 138 MMOL/L — SIGNIFICANT CHANGE UP (ref 135–145)
SP GR SPEC: 1.01 — SIGNIFICANT CHANGE UP (ref 1–1.03)
TROPONIN T SERPL-MCNC: <0.01 NG/ML — SIGNIFICANT CHANGE UP (ref 0–0.01)
UROBILINOGEN FLD QL: 0.2 E.U./DL — SIGNIFICANT CHANGE UP
WBC # BLD: 4.95 K/UL — SIGNIFICANT CHANGE UP (ref 3.8–10.5)
WBC # FLD AUTO: 4.95 K/UL — SIGNIFICANT CHANGE UP (ref 3.8–10.5)

## 2020-03-02 PROCEDURE — 85025 COMPLETE CBC W/AUTO DIFF WBC: CPT

## 2020-03-02 PROCEDURE — 81003 URINALYSIS AUTO W/O SCOPE: CPT

## 2020-03-02 PROCEDURE — 80053 COMPREHEN METABOLIC PANEL: CPT

## 2020-03-02 PROCEDURE — 71046 X-RAY EXAM CHEST 2 VIEWS: CPT

## 2020-03-02 PROCEDURE — 87086 URINE CULTURE/COLONY COUNT: CPT

## 2020-03-02 PROCEDURE — 99285 EMERGENCY DEPT VISIT HI MDM: CPT

## 2020-03-02 PROCEDURE — 71046 X-RAY EXAM CHEST 2 VIEWS: CPT | Mod: 26

## 2020-03-02 PROCEDURE — 99283 EMERGENCY DEPT VISIT LOW MDM: CPT | Mod: 25

## 2020-03-02 PROCEDURE — 84484 ASSAY OF TROPONIN QUANT: CPT

## 2020-03-02 PROCEDURE — 36415 COLL VENOUS BLD VENIPUNCTURE: CPT

## 2020-03-02 PROCEDURE — 87631 RESP VIRUS 3-5 TARGETS: CPT

## 2020-03-02 RX ORDER — ACETAMINOPHEN 500 MG
650 TABLET ORAL ONCE
Refills: 0 | Status: COMPLETED | OUTPATIENT
Start: 2020-03-02 | End: 2020-03-02

## 2020-03-02 RX ADMIN — Medication 650 MILLIGRAM(S): at 10:53

## 2020-03-02 NOTE — ED PROVIDER NOTE - NSFOLLOWUPINSTRUCTIONS_ED_ALL_ED_FT
Can take tylenol 650mg or motrin 600mg (May cause stomach irritation - take with food and avoid prolonged use) every 6hrs as needed for pain.  Stay well hydrated.  Return for persistent fevers, persistent vomit, uncontrolled pain, worsening breathing, worsening lightheaded.  Follow up with primary doctor within 1-2 days.     Viral Respiratory Infection    A viral respiratory infection is an illness that affects parts of the body used for breathing, like the lungs, nose, and throat. It is caused by a germ called a virus. Symptoms can include runny nose, coughing, sneezing, fatigue, body aches, sore throat, fever, or headache. Over the counter medicine can be used to manage the symptoms but the infection typically goes away on its own in 5 to 10 days.     SEEK IMMEDIATE MEDICAL CARE IF YOU HAVE ANY OF THE FOLLOWING SYMPTOMS: shortness of breath, chest pain, fever over 10 days, or lightheadedness/dizziness.     Cough    Coughing is a reflex that clears your throat and your airways. Coughing helps to heal and protect your lungs. It is normal to cough occasionally, but a cough that happens with other symptoms or lasts a long time may be a sign of a condition that needs treatment. Coughing may be caused by infections, asthma or COPD, smoking, postnasal drip, gastroesophageal reflux, as well as other medical conditions. Take medicines only as instructed by your health care provider. Avoid environments or triggers that causes you to cough at work or at home.    SEEK IMMEDIATE MEDICAL CARE IF YOU HAVE ANY OF THE FOLLOWING SYMPTOMS: coughing up blood, shortness of breath, rapid heart rate, chest pain, unexplained weight loss or night sweats.

## 2020-03-02 NOTE — ED PROVIDER NOTE - PROGRESS NOTE DETAILS
Klepfish: Cr 1.33, other labs grossly wnl. CXR no acute pathology. Overall Feeling much better. Likely viral. Clinically no indication for further emergent ED workup or hospitalization at this time. Comfortable for dc, outpt f/u.

## 2020-03-02 NOTE — ED PROVIDER NOTE - PATIENT PORTAL LINK FT
You can access the FollowMyHealth Patient Portal offered by [facility] by registering at the following website: [email or web address]. By joining [employer]’s FollowMyHealth portal, you will also be able to view your health information using other applications (apps) compatible with our system.

## 2020-03-02 NOTE — ED ADULT NURSE NOTE - OBJECTIVE STATEMENT
63 y/o male w/ hx of HTN and pacemaker presents to the ED w/ multiple medical complaints. Pt reports 1 week hx of sore throat c/o feeling of fluid in throat/upper chest. Pt reports burning sensation to throat radiating to chest. Pt reports his urine has been slightly frothy. Denies fever, chills, NVD, abdominal pain, headache, SOB, urinary frequency, back pain, and any other sx. Pt denies recent travel.

## 2020-03-02 NOTE — ED PROVIDER NOTE - OBJECTIVE STATEMENT
64M PMH HTN, PPM p/w several complaints. Has 1w of sore throat, cough. Feels like there is fluid in his throat/upper chest. Feels burning in his throat radiating to his chest causing chest tightness. Also feels like he may have a UTI bec his urine is slightly frothy. +Chronic intermittent fluctuating b/l LE edema, worse w/ standing. Finally decided to come to ED. Denies dysphagia/odynophagia, f/c, HA, SOB, NVD, abd pain, dysuria/frequency, focal weakness/numbness, back pain, rashes, recent travel. +co-worker w/ cold-like symptoms.

## 2020-03-02 NOTE — ED ADULT TRIAGE NOTE - CHIEF COMPLAINT QUOTE
pt c/o burning in chest, itching in ears, cold symptoms. hx pacemaker. ekg in progress. mask applied.

## 2020-03-03 LAB
CULTURE RESULTS: NO GROWTH — SIGNIFICANT CHANGE UP
SPECIMEN SOURCE: SIGNIFICANT CHANGE UP

## 2020-03-07 DIAGNOSIS — R05 COUGH: ICD-10-CM

## 2020-03-07 DIAGNOSIS — R60.0 LOCALIZED EDEMA: ICD-10-CM

## 2020-03-07 DIAGNOSIS — Z79.899 OTHER LONG TERM (CURRENT) DRUG THERAPY: ICD-10-CM

## 2020-03-07 DIAGNOSIS — Z79.2 LONG TERM (CURRENT) USE OF ANTIBIOTICS: ICD-10-CM

## 2020-05-13 NOTE — PATIENT PROFILE ADULT. - NS PRO CONTRA FLU 1
Addended by: JORY PÉREZ on: 5/13/2020 04:07 PM     Modules accepted: Orders    
out of season (available sept 1 thru apr 2 only)

## 2020-11-10 NOTE — ED ADULT NURSE NOTE - BREATH SOUNDS, RIGHT
[facility]  Discharge Note  Short Stay    Procedure(s) (LRB):  REPAIR, HERNIA, INGUINAL, WITHOUT HISTORY OF PRIOR REPAIR, AGE 5 YEARS OR OLDER (Right)    OUTCOME: Patient tolerated treatment/procedure well without complication and is now ready for discharge.    DISPOSITION: Home or Self Care    FINAL DIAGNOSIS:  Inguinal hernia of right side without obstruction or gangrene    FOLLOWUP: In clinic    DISCHARGE INSTRUCTIONS:    Discharge Procedure Orders   Diet Adult Regular     No driving until:     Order Specific Question Answer Comments   Date: 12/14/2020      Other restrictions (specify):   Order Comments: No bending, lifting, pushing, pulling, climbing, driving, etc until released by surgery clinic.  Nothing even the least bit exertional or strenuous until released by surgery clinic.  Showers permissible.  No bathing or swimming.   Scheduling Instructions: No bending, lifting, pushing, pulling, climbing, driving, etc until released by surgery clinic.  Nothing even the least bit exertional or strenuous until released by surgery clinic.  Showers permissible.  No bathing or swimming.     No dressing needed         
clear

## 2021-05-27 NOTE — ED ADULT TRIAGE NOTE - MODE OF ARRIVAL
Walk in I personally evaluated patient. I agree with the findings and plan with all documentation on chart except as documented  in my note.    I personally saw and evaluated patient.  I reviewed prior chart and ED visits if available.  VS reviewed.     41 y/o M PMHx Anxiety, Seizure, Polysubstance abuse who presents for anxiety. Patient is usually controlled on Klonipin but he ran out (He gave friends 5 pills). Patient calm and well appearing and denies HI or SI. He is requesting medication refill. Will Refill Gabapentin and Keppra. Patient aware we cannot refill Klonipin and he has an appointment today to see his doctor.    Patient has no signs of life threatening withdrawal. Patient is clinically sober as evidenced by clear speech and stable gait. Patient denies any homicidal or suicidal ideations and is answering questions appropriately.  Patient does not require emergent psychiatric evaluation. Patient given information for outpatient detox.  Patient stable for discharge with proper follow up.

## 2021-10-06 PROBLEM — J44.9 OBSTRUCTIVE LUNG DISEASE: Status: ACTIVE | Noted: 2018-10-12

## 2021-12-02 ENCOUNTER — EMERGENCY (EMERGENCY)
Facility: HOSPITAL | Age: 66
LOS: 1 days | Discharge: HOME | End: 2021-12-02
Admitting: EMERGENCY MEDICINE
Payer: MEDICARE

## 2021-12-02 VITALS
HEIGHT: 74 IN | OXYGEN SATURATION: 99 % | SYSTOLIC BLOOD PRESSURE: 149 MMHG | HEART RATE: 70 BPM | RESPIRATION RATE: 18 BRPM | DIASTOLIC BLOOD PRESSURE: 94 MMHG | TEMPERATURE: 98 F

## 2021-12-02 DIAGNOSIS — S05.02XA INJURY OF CONJUNCTIVA AND CORNEAL ABRASION WITHOUT FOREIGN BODY, LEFT EYE, INITIAL ENCOUNTER: ICD-10-CM
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
## 2021-12-02 DIAGNOSIS — Z95.0 PRESENCE OF CARDIAC PACEMAKER: Chronic | ICD-10-CM

## 2021-12-02 PROCEDURE — 99283 EMERGENCY DEPT VISIT LOW MDM: CPT

## 2021-12-02 RX ORDER — CIPROFLOXACIN HCL 0.3 %
2 DROPS OPHTHALMIC (EYE)
Qty: 1 | Refills: 0
Start: 2021-12-02 | End: 2021-12-06

## 2021-12-02 RX ORDER — ERYTHROMYCIN BASE 5 MG/GRAM
1 OINTMENT (GRAM) OPHTHALMIC (EYE)
Qty: 1 | Refills: 0
Start: 2021-12-02 | End: 2021-12-11

## 2021-12-02 RX ORDER — CIPROFLOXACIN HCL 0.3 %
1 DROPS OPHTHALMIC (EYE) ONCE
Refills: 0 | Status: COMPLETED | OUTPATIENT
Start: 2021-12-02 | End: 2021-12-02

## 2021-12-02 RX ORDER — ERYTHROMYCIN BASE 5 MG/GRAM
1 OINTMENT (GRAM) OPHTHALMIC (EYE) ONCE
Refills: 0 | Status: COMPLETED | OUTPATIENT
Start: 2021-12-02 | End: 2021-12-02

## 2021-12-02 RX ADMIN — Medication 1 DROP(S): at 05:01

## 2021-12-02 RX ADMIN — Medication 1 APPLICATION(S): at 04:38

## 2021-12-02 NOTE — ED PROVIDER NOTE - CLINICAL SUMMARY MEDICAL DECISION MAKING FREE TEXT BOX
Minor corneal abrasion left eye.  Vision excellent.  IOP normal. Plan: abx ophthal gtts during waking hours, erythromycin oph oint at bedtime.  follow up with ophthalmologist.

## 2021-12-02 NOTE — ED PROVIDER NOTE - OBJECTIVE STATEMENT
pt states the edge of his surgical mask contacted his left cornea tonight around 1 am while putting his mask on.  Since then the eye feels "irritated", tried OTC eye drops without relief.  Says this happened before and he says he had a scratched cornea.  Pain is mild, not having trouble keeping eye open. No change in vision. No other complaints. pt states the edge of his surgical mask contacted his left cornea tonight around 1 am while putting his mask on.  Since then the eye feels "irritated", tried OTC eye drops without relief.  Says this happened before and he says he had a scratched cornea.  Pain is mild, not having trouble keeping eye open. No change in vision.  No flashes or floaters. No other complaints.  Pt states he sees an ophthalmologist for mildly elevated IOP and takes drops for it, but has been told he does not have glaucoma.  No contact lenses.

## 2021-12-02 NOTE — ED PROVIDER NOTE - EYE EXAM METHOD
Slight fluorescein uptake over left cornea, 4-6 oclock position,  not over visual axis.  no fluorescein streaming.  no foreign body appreciated./fluorescein/slit lamp

## 2021-12-02 NOTE — ED ADULT NURSE NOTE - OBJECTIVE STATEMENT
67yo male complaining of pain after surgical mask struck the side of his left cornea tonight. Treated with OTC eye drops without relief. Denies changes in vision, no gross redness noted.

## 2021-12-02 NOTE — ED PROVIDER NOTE - NSFOLLOWUPINSTRUCTIONS_ED_ALL_ED_FT
Ciprofloxacin ophthalmic 2 drops to left eye every 4 hours while awake.  Erythromycin opthalmic ointment to left eye at bedtime.    Follow up with your ophthalmologist in 1-2 days.    Return to the Emergency Department if you have any new or worsening symptoms, or if you have any concerns.  =============================    Corneal Abrasion     WHAT YOU NEED TO KNOW:    A corneal abrasion is a scratch on the cornea of your eye. The cornea is the clear layer that covers the front of your eye. A small scratch may heal in 1 to 2 days. Deeper or larger scratches may take longer to heal.     Eye Anatomy         DISCHARGE INSTRUCTIONS:    Call your doctor or ophthalmologist if:   •Your eye pain or vision gets worse.      •You have yellow or green drainage from your eye.      •You have questions or concerns about your condition or care.      Medicines:   •Medicines may be given in the form of eyedrops or ointment to help prevent an eye infection. You may also be given eyedrops to decrease pain.      •Take your medicine as directed. Contact your healthcare provider if you think your medicine is not helping or if you have side effects. Tell him or her if you are allergic to any medicine. Keep a list of the medicines, vitamins, and herbs you take. Include the amounts, and when and why you take them. Bring the list or the pill bottles to follow-up visits. Carry your medicine list with you in case of an emergency.      Care for your eyes:   •Get regular eye exams. Get your eyes checked at least every year.      •Eat healthy foods. Fresh fruits and vegetables that are rich in vitamins A and C may help with your vision. Foods such as sweet potatoes, apricots, and carrots are rich in nutrients for the eyes.  Sources of Vitamin A       Sources of Vitamin C           •Take care of your contacts or glasses. Store, clean, and use your contacts or glasses as directed. Replace your glasses or contact lenses as often as your healthcare provider suggests.      •Decrease eye strain. Rest your eyes, especially after you read or use a computer for long periods of time. Get plenty of sleep at night. Use lights that reduce glare in your home, school, or workplace.      •Wear dark sunglasses. This will help prevent pain and light sensitivity. Make sure the sunglasses have UVA and UVB protection. This will protect your eyes when you go outside.      •Use eyedrops safely. If your treatment plan includes eyedrops, it is important to use them as directed. Your provider may give you detailed instructions to follow. The eyedrops may also come with safety instructions. Follow all instructions to help prevent an infection. Do not touch the tip of the bottle to your eye. Germs from your eye can spread to the medicine bottle.  Steps 1 2 3 4           Help prevent corneal abrasions:   •Remove your contact lenses if your eyes feel dry or irritated.      •Wash your hands if you need to touch your eyes or your face.  Handwashing           •Trim your fingernails so you cannot scratch your eye.      •Wear protective eyewear when you work with chemicals, wood, dust, or metal.      •Wear protective eyewear when you play sports.      •Do not wear your contacts for longer than you should.      •Do not sleep with your contacts in.      •Do not wear glitter makeup. Glitter can easily get into your eyes and under contact lenses.      Follow up with your doctor or ophthalmologist as directed: Write down your questions so you remember to ask them during your visits.

## 2021-12-02 NOTE — ED PROVIDER NOTE - PATIENT PORTAL LINK FT
You can access the FollowMyHealth Patient Portal offered by [facility] by registering at the following website: [email or web address]. By joining [employer]’s FollowMyHealth portal, you will also be able to view your health information using other applications (apps) compatible with our system.

## 2021-12-02 NOTE — ED ADULT NURSE NOTE - CCCP TRG CHIEF CMPLNT
Continue to weight bear as tolerated  Continue range of motion  Ice and elevate as needed  Tylenol or Motrin for pain  Injection given today in the office   Rest for 24-48 hours  Follow up in 6 weeks, if better call the office and cancel eye foreign body

## 2021-12-27 ENCOUNTER — EMERGENCY (EMERGENCY)
Facility: HOSPITAL | Age: 66
LOS: 1 days | Discharge: HOME | End: 2021-12-27
Attending: EMERGENCY MEDICINE | Admitting: EMERGENCY MEDICINE
Payer: MEDICARE

## 2021-12-27 VITALS
DIASTOLIC BLOOD PRESSURE: 88 MMHG | HEART RATE: 70 BPM | SYSTOLIC BLOOD PRESSURE: 156 MMHG | RESPIRATION RATE: 18 BRPM | OXYGEN SATURATION: 99 % | TEMPERATURE: 98 F | HEIGHT: 74 IN

## 2021-12-27 VITALS
DIASTOLIC BLOOD PRESSURE: 89 MMHG | TEMPERATURE: 99 F | HEART RATE: 62 BPM | OXYGEN SATURATION: 98 % | RESPIRATION RATE: 18 BRPM | SYSTOLIC BLOOD PRESSURE: 160 MMHG

## 2021-12-27 DIAGNOSIS — Z87.891 PERSONAL HISTORY OF NICOTINE DEPENDENCE: ICD-10-CM

## 2021-12-27 DIAGNOSIS — R53.1 WEAKNESS: ICD-10-CM

## 2021-12-27 DIAGNOSIS — Z79.01 LONG TERM (CURRENT) USE OF ANTICOAGULANTS: ICD-10-CM

## 2021-12-27 DIAGNOSIS — R07.9 CHEST PAIN, UNSPECIFIED: ICD-10-CM

## 2021-12-27 DIAGNOSIS — R05.9 COUGH, UNSPECIFIED: ICD-10-CM

## 2021-12-27 DIAGNOSIS — Z95.0 PRESENCE OF CARDIAC PACEMAKER: Chronic | ICD-10-CM

## 2021-12-27 DIAGNOSIS — I10 ESSENTIAL (PRIMARY) HYPERTENSION: ICD-10-CM

## 2021-12-27 DIAGNOSIS — U07.1 COVID-19: ICD-10-CM

## 2021-12-27 LAB
ALBUMIN SERPL ELPH-MCNC: 4.4 G/DL — SIGNIFICANT CHANGE UP (ref 3.3–5)
ALP SERPL-CCNC: 98 U/L — SIGNIFICANT CHANGE UP (ref 40–120)
ALT FLD-CCNC: 22 U/L — SIGNIFICANT CHANGE UP (ref 10–45)
ANION GAP SERPL CALC-SCNC: 15 MMOL/L — SIGNIFICANT CHANGE UP (ref 5–17)
APTT BLD: 30.6 SEC — SIGNIFICANT CHANGE UP (ref 27.5–35.5)
AST SERPL-CCNC: 26 U/L — SIGNIFICANT CHANGE UP (ref 10–40)
BASOPHILS # BLD AUTO: 0.02 K/UL — SIGNIFICANT CHANGE UP (ref 0–0.2)
BASOPHILS NFR BLD AUTO: 0.3 % — SIGNIFICANT CHANGE UP (ref 0–2)
BILIRUB SERPL-MCNC: 0.4 MG/DL — SIGNIFICANT CHANGE UP (ref 0.2–1.2)
BUN SERPL-MCNC: 14 MG/DL — SIGNIFICANT CHANGE UP (ref 7–23)
CALCIUM SERPL-MCNC: 8.9 MG/DL — SIGNIFICANT CHANGE UP (ref 8.4–10.5)
CHLORIDE SERPL-SCNC: 100 MMOL/L — SIGNIFICANT CHANGE UP (ref 96–108)
CO2 SERPL-SCNC: 25 MMOL/L — SIGNIFICANT CHANGE UP (ref 22–31)
CREAT SERPL-MCNC: 1.17 MG/DL — SIGNIFICANT CHANGE UP (ref 0.5–1.3)
CRP SERPL-MCNC: 21.2 MG/L — HIGH (ref 0–4)
EOSINOPHIL # BLD AUTO: 0.02 K/UL — SIGNIFICANT CHANGE UP (ref 0–0.5)
EOSINOPHIL NFR BLD AUTO: 0.3 % — SIGNIFICANT CHANGE UP (ref 0–6)
FERRITIN SERPL-MCNC: 131 NG/ML — SIGNIFICANT CHANGE UP (ref 30–400)
GLUCOSE SERPL-MCNC: 118 MG/DL — HIGH (ref 70–99)
HCT VFR BLD CALC: 44.5 % — SIGNIFICANT CHANGE UP (ref 39–50)
HGB BLD-MCNC: 14.1 G/DL — SIGNIFICANT CHANGE UP (ref 13–17)
IMM GRANULOCYTES NFR BLD AUTO: 0.2 % — SIGNIFICANT CHANGE UP (ref 0–1.5)
INR BLD: 1.11 — SIGNIFICANT CHANGE UP (ref 0.88–1.16)
LYMPHOCYTES # BLD AUTO: 0.95 K/UL — LOW (ref 1–3.3)
LYMPHOCYTES # BLD AUTO: 16 % — SIGNIFICANT CHANGE UP (ref 13–44)
MCHC RBC-ENTMCNC: 30.7 PG — SIGNIFICANT CHANGE UP (ref 27–34)
MCHC RBC-ENTMCNC: 31.7 GM/DL — LOW (ref 32–36)
MCV RBC AUTO: 96.9 FL — SIGNIFICANT CHANGE UP (ref 80–100)
MONOCYTES # BLD AUTO: 0.63 K/UL — SIGNIFICANT CHANGE UP (ref 0–0.9)
MONOCYTES NFR BLD AUTO: 10.6 % — SIGNIFICANT CHANGE UP (ref 2–14)
NEUTROPHILS # BLD AUTO: 4.32 K/UL — SIGNIFICANT CHANGE UP (ref 1.8–7.4)
NEUTROPHILS NFR BLD AUTO: 72.6 % — SIGNIFICANT CHANGE UP (ref 43–77)
NRBC # BLD: 0 /100 WBCS — SIGNIFICANT CHANGE UP (ref 0–0)
PLATELET # BLD AUTO: 159 K/UL — SIGNIFICANT CHANGE UP (ref 150–400)
POTASSIUM SERPL-MCNC: 4.4 MMOL/L — SIGNIFICANT CHANGE UP (ref 3.5–5.3)
POTASSIUM SERPL-SCNC: 4.4 MMOL/L — SIGNIFICANT CHANGE UP (ref 3.5–5.3)
PROCALCITONIN SERPL-MCNC: 0.1 NG/ML — SIGNIFICANT CHANGE UP (ref 0.02–0.1)
PROT SERPL-MCNC: 7.7 G/DL — SIGNIFICANT CHANGE UP (ref 6–8.3)
PROTHROM AB SERPL-ACNC: 13.3 SEC — SIGNIFICANT CHANGE UP (ref 10.6–13.6)
RBC # BLD: 4.59 M/UL — SIGNIFICANT CHANGE UP (ref 4.2–5.8)
RBC # FLD: 12.9 % — SIGNIFICANT CHANGE UP (ref 10.3–14.5)
SARS-COV-2 RNA SPEC QL NAA+PROBE: DETECTED
SODIUM SERPL-SCNC: 140 MMOL/L — SIGNIFICANT CHANGE UP (ref 135–145)
TROPONIN T SERPL-MCNC: 0.01 NG/ML — SIGNIFICANT CHANGE UP (ref 0–0.01)
WBC # BLD: 5.95 K/UL — SIGNIFICANT CHANGE UP (ref 3.8–10.5)
WBC # FLD AUTO: 5.95 K/UL — SIGNIFICANT CHANGE UP (ref 3.8–10.5)

## 2021-12-27 PROCEDURE — 99285 EMERGENCY DEPT VISIT HI MDM: CPT | Mod: CS

## 2021-12-27 PROCEDURE — 82728 ASSAY OF FERRITIN: CPT

## 2021-12-27 PROCEDURE — U0003: CPT

## 2021-12-27 PROCEDURE — 80053 COMPREHEN METABOLIC PANEL: CPT

## 2021-12-27 PROCEDURE — 71045 X-RAY EXAM CHEST 1 VIEW: CPT

## 2021-12-27 PROCEDURE — 85025 COMPLETE CBC W/AUTO DIFF WBC: CPT

## 2021-12-27 PROCEDURE — U0005: CPT

## 2021-12-27 PROCEDURE — 85610 PROTHROMBIN TIME: CPT

## 2021-12-27 PROCEDURE — 36415 COLL VENOUS BLD VENIPUNCTURE: CPT

## 2021-12-27 PROCEDURE — 93005 ELECTROCARDIOGRAM TRACING: CPT

## 2021-12-27 PROCEDURE — 99285 EMERGENCY DEPT VISIT HI MDM: CPT | Mod: 25

## 2021-12-27 PROCEDURE — 85730 THROMBOPLASTIN TIME PARTIAL: CPT

## 2021-12-27 PROCEDURE — 71045 X-RAY EXAM CHEST 1 VIEW: CPT | Mod: 26

## 2021-12-27 PROCEDURE — 84145 PROCALCITONIN (PCT): CPT

## 2021-12-27 PROCEDURE — 84484 ASSAY OF TROPONIN QUANT: CPT

## 2021-12-27 PROCEDURE — 86140 C-REACTIVE PROTEIN: CPT

## 2021-12-27 NOTE — ED PROVIDER NOTE - CLINICAL SUMMARY MEDICAL DECISION MAKING FREE TEXT BOX
65 yo m with pmh of HTN and pacemaker c/o CP that started 3 days ago. Pt states Sat night he felt  burning in his lungs. Symptoms resolved but then came back yesterday. Associated with a mild dry cough and generalized weakness. Currently cp free. Denies fever, chills, sob, n/v/d, bodyaches, sore throat, HA. VSS. O2 sat 99% on RA. Well appearing. Lungs cta b/l. Labs, cxr, covid swab. Low suspicion for ACS.

## 2021-12-27 NOTE — ED PROVIDER NOTE - PROGRESS NOTE DETAILS
pt signed out pending labs and cxr which are unremarkable, no evidence of pna, mildly elevated crp.  Covid result is pending, pt not hypoxic or tachypneic.  Will d/c, continue supportive care, isolate, f/u covid result, return to ED if sx worsen.

## 2021-12-27 NOTE — ED PROVIDER NOTE - PATIENT PORTAL LINK FT
You can access the FollowMyHealth Patient Portal offered by [facility] by registering at the following website: [email or web address]. By joining [employer]’s FollowMyHealth portal, you will also be able to view your health information using other applications (apps) compatible with our system.

## 2021-12-27 NOTE — ED PROVIDER NOTE - OBJECTIVE STATEMENT
65 yo m with pmh of HTN and pacemaker c/o CP that started 3 days ago. Pt states Sat night he felt  burning in his lungs. Symptoms resolved but then came back yesterday. Associated with a mild dry cough and generalized weakness. Currently cp free. Denies fever, chills, sob, n/v/d, bodyaches, sore throat, HA. Pt received second covid vaccine in Aug. States he had COVID in the beginning of March of 2020. Reports sick contact at work.

## 2021-12-27 NOTE — ED PROVIDER NOTE - ATTENDING CONTRIBUTION TO CARE
66M hx htn, pacemaker, c/o chest pain for past 3 days.  +dry cough, +generalized weakness. no fevers. no n/v/d. pt states now pain resolved.   gen- nad  heent- ncat, clear conj  cv -rrr  lungs -ctab  abd - soft, nt, nd  ext -wwp, no edema  neuro -aox3, steady gait, whelan  no acute st/twave changes on EKG, doubt acs, will check labs, including covid. no hypoxia, no resp distress.

## 2021-12-27 NOTE — ED ADULT NURSE NOTE - NSICDXPASTMEDICALHX_GEN_ALL_CORE_FT
PAST MEDICAL HISTORY:  Atrial fibrillation     BPH (benign prostatic hyperplasia)     GERD (gastroesophageal reflux disease)     High cholesterol     HTN (hypertension)     Pacemaker     Syncope, unspecified syncope type

## 2021-12-27 NOTE — ED PROVIDER NOTE - NSFOLLOWUPINSTRUCTIONS_ED_ALL_ED_FT
Cold Symptoms    WHAT YOU NEED TO KNOW:    A cold is an infection caused by a virus. The infection causes your upper respiratory system to become inflamed. Common symptoms of a cold include sneezing, dry throat, a stuffy nose, headache, watery eyes, and a cough. Your cough may be dry, or you may cough up mucus. You may also have muscle aches, joint pain, and tiredness. Rarely, you may have a fever. Most colds go away without treatment.    DISCHARGE INSTRUCTIONS:    Return to the emergency department if:   •You have increased tiredness and weakness.      •You are unable to eat.       •Your heart is beating much faster than usual for you.       •You see white spots in the back of your throat and your neck is swollen and sore to the touch.       •You see pinpoint or larger reddish-purple dots on your skin.       Contact your healthcare provider if:   •You have a fever higher than 102°F (38.9°C).      •You have new or worsening shortness of breath.       •You have thick nasal drainage for more than 2 days.       •Your symptoms do not improve or get worse within 5 days.       •You have questions or concerns about your condition or care.      Medicines: The following medicines may be suggested by your healthcare provider to decrease your cold symptoms. These medicines are available without a doctor's order. Ask which medicines to take and when to take them. Follow directions.  •NSAIDs or acetaminophen help to bring down a fever or decrease pain.      •Decongestants help decrease nasal stuffiness.       •Antihistamines help decrease sneezing and a runny nose.       •Cough suppressants help decrease how much you cough.      •Expectorants help loosen mucus so you can cough it up.      •Take your medicine as directed. Contact your healthcare provider if you think your medicine is not helping or if you have side effects. Tell him of her if you are allergic to any medicine. Keep a list of the medicines, vitamins, and herbs you take. Include the amounts, and when and why you take them. Bring the list or the pill bottles to follow-up visits. Carry your medicine list with you in case of an emergency.      Symptom relief: The following may help relieve cold symptoms, such as a dry throat and congestion:   •Gargle with mouthwash or warm salt water as directed.       •Suck on throat lozenges or hard candy.       •Use a cold or warm vaporizer or humidifier to ease your breathing.       •Rest for at least 2 days and then as needed to decrease tiredness and weakness.       •Use petroleum based jelly around your nostrils to decrease irritation from blowing your nose.       Drink liquids: Liquids will help thin and loosen thick mucus so you can cough it up. Liquids will also keep you hydrated. Ask your healthcare provider which liquids are best for you and how much to drink each day.    Prevent the spread of germs: You can spread your cold germs to others for at least 3 days after your symptoms start. Wash your hands often. Do not share items, such as eating utensils. Cover your nose and mouth when you cough or sneeze using the crook of your elbow instead of your hands. Throw used tissues in the garbage.    Do not smoke: Smoking may worsen your symptoms and increase the length of time you feel sick. Talk with your healthcare provider if you need help to stop smoking.    Follow up with your doctor as directed: Write down your questions so you remember to ask them during your visits.

## 2023-01-01 NOTE — ED ADULT NURSE NOTE - NURSING MUSC ROM
full range of motion in all extremities
This document is complete and the patient is ready for discharge.

## 2023-04-06 NOTE — ED ADULT NURSE NOTE - EXTENSIONS OF SELF_ADULT
None Sotyktu Counseling:  I discussed the most common side effects of Sotyktu including: common cold, sore throat, sinus infections, cold sores, canker sores, folliculitis, and acne.? I also discussed more serious side effects of Sotyktu including but not limited to: serious allergic reactions; increased risk for infections such as TB; cancers such as lymphomas; rhabdomyolysis and elevated CPK; and elevated triglycerides and liver enzymes.?

## 2023-08-09 NOTE — DISCHARGE NOTE ADULT - THE PATIENT HAS
Detail Level: Detailed Quality 130: Documentation Of Current Medications In The Medical Record: Current Medications Documented Quality 431: Preventive Care And Screening: Unhealthy Alcohol Use - Screening: Patient not identified as an unhealthy alcohol user when screened for unhealthy alcohol use using a systematic screening method Quality 226: Preventive Care And Screening: Tobacco Use: Screening And Cessation Intervention: Patient screened for tobacco use and is an ex/non-smoker no difficulties

## 2024-02-23 NOTE — PATIENT PROFILE ADULT. - NS PRO AD PATIENT TYPE
Head, normocephalic, atraumatic, Face, Face within normal limits, Ears, External ears within normal limits, Nose/Nasopharynx, External nose normal appearance, nares patent, no nasal discharge, Mouth and Throat, Oral cavity appearance normal, Lips, Appearance normal Health Care Proxy (HCP)

## 2024-02-29 NOTE — CONSULT NOTE ADULT - CONSULT REQUESTED BY NAME
Caller: Carla Reed    Relationship to patient: Self    Best call back number: [phone]     Patient is needing:     PATIENT WOULD LIKE TO DO PT AT [facility] IN [place]  [address] · [phone], BECAUSE IT WOULD BE MORE CONVENIENT FOR AFTER WORK.    PLEASE CALL TO ADVISE      
I have talked with the patient and referral has been sent to [facility]  
Horace
Dr. Vu

## 2024-09-25 NOTE — ED ADULT NURSE NOTE - PAIN RATING/NUMBER SCALE (0-10): REST
October 10, 2024      Dayron Rick  [address] [facility] [address]              Dear Dayron,  Standard Miralax Bowel Prep   Prep instructions for your colonoscopy   For prep questions, please call: [facility] - [address] [facility] [place] [address] - [phone]    Please read these instructions carefully at least 7 days prior to your colonoscopy procedure. Be sure to follow all directions completely. The inside of your colon must be clean to allow for a complete examination for the presence of any growths, polyps, and/or abnormalities, as well as their biopsy or removal. A number of tips are included in order to make this part of the procedure as comfortable as possible.    Getting ready   Purchase the following items over-the-counter/off the shelf at the drug store:    Four (4) - Dulcolax laxative (Bisacodyl) 5mg tablets (Do not use Dulcolax stool softener)   8.3 ounce bottle of Miralax powder (ClearLAX, SmoothLAX, PowderLAX)  64 ounces of Gatorade or similar sports drink. Not red or purple. (Pedialyte, Propel, Gatorade G2/Zero, Powerade, Powerade Zero)   10 ounce bottle of clear Magnesium Citrate    A nurse will call you to go over your appointment details and prep instructions. Not completing the nurse call could result with your appointment being cancelled.    You must arrange for an adult to drive you home after your exam. Your colonoscopy cannot be done unless you have a ride. If you need to use public transportation, someone must ride with you and stay with you for up to 24 hours.       7 days before procedure     Consult with your prescribing provider about stopping any:     Diabetic/Weight Loss Injectable Medication GLP-1 agonist (such as Exenatide (Byetta, Bydureon),  Mounjaro (Tirzepatide). Ozempic (Semaglutide). Semaglutide. Symlin (Pamlintide), Tanzeum (Albiglutide). Tirzepatide-Weight Management (Zepbound), Trulicity (Dulaglutide), Victoza (Saxenda, Liraglutide), Wegovy  (Semaglutide) please follow below guidelines for holding:    For weekly injection HOLD 7 days before procedure.  For once or twice a day injection HOLD the day before procedure and day of procedure.  For oral, daily dosing (Rybelsus) HOLD 7 days before procedure.    Blood thinning and/or anti platelet medications: such as Coumadin, Plavix, Xarelto, Eliquis, Lovenox or others, these medications may need to be stopped temporarily before your procedure.     If you take insulin for diabetes, ask your prescribing provider for instructions on how to take this medication while preparing for a colonoscopy.      Stop taking iron (ferrous sulfate), multivitamins that contain iron, and/or fiber supplements (Metamucil, Benefiber, Psyllium husk powder, Fibercon, etc.).      Stop eating whole kernel corn, popcorn, nuts, and foods that contain seeds. These can stay in the colon for many days, and they can clog up the colonoscope.       3 days before procedure     Begin a low-fiber diet (see examples below). No Olestra (a fat substitute).    Consume no more than 10-15 grams of fiber each day.     It is important to stay hydrated. Drink at least eight 8-ounce glasses of water a day.      LOW FIBER DIET   You can have:   Do not have:    Starches: White bread, rolls, biscuits, croissants, [place] toast, white flour tortillas, waffles, pancakes, [place] toast; white rice, noodles, pasta, macaroni; cooked and peeled potatoes; plain crackers, saltines; cooked farina or cream of rice; puffed rice, corn flakes, Rice Krispies, Special K      Vegetables: tender cooked and canned, vegetable broths     Fruits and fruit juices: Strained fruit juice, canned fruit without seeds or skin (not pineapple), applesauce, pear sauce, ripe bananas, melons (not watermelon)     Milk products: Milk (plain or flavored), cheese, cottage cheese, yogurt (no berries), custard, ice cream       Proteins: Tender, well-cooked ground beef, lamb, veal, ham, pork, chicken,  turkey, fish or organ meat, Tofu, eggs, creamy peanut butter      Fats and condiments:  Margarine, butter, oils, mayonnaise, sour cream, salad dressing, plain gravy; spices, cooked herbs; sugar, clear jelly, honey, syrup      Snacks, sweets and drinks: Pretzels, hard candy; plain cakes and cookies (no nuts or seeds); gelatin, plain pudding, sherbet, Popsicles; coffee, tea, carbonated ( fizzy ) drinks  Starches: Breads or rolls that contain nuts, seeds or fruit; whole wheat or whole grain breads that contain more than 2 grams of fiber per serving; cornbread; corn or whole wheat tortillas; potatoes with skin; brown rice, wild rice, quinoa, kasha (buckwheat), and oatmeal      Vegetables: Any raw or steamed vegetables; vegetables with seeds; corn in any form      Fruits and fruit juices: Prunes, prune juice, raisins and other dried fruits, berries and other fruits with seeds, canned pineapple juices with pulp such as orange, grapefruit, pineapple or tomato juice     Milk products: Any yogurt with nuts, seeds or berries      Proteins: Tough, fibrous meats with gristle; cooked dried beans, peas or lentils; crunchy peanut butter     Fats and condiments: Pickles, olives, relish, horseradish; jam, marmalade, preserves      Snacks, sweets and drinks: Popcorn, nuts, seeds, granola, coconut, candies made with nuts or seeds; all desserts that contain nuts, seeds, raisins and other dried fruits, coconut, whole grains or bran.       1 day before procedure       Start a clear liquid diet (see examples below). Do not eat any solid food.      Drink at least eight to ten 8-ounce glasses of water throughout the day. ? ? ? ? ? ? ? ?      CLEAR LIQUID DIET:  You can have: Do not have:    Water, tea, coffee (no milk or cream)   Soda pop, Gatorade (not red or purple)   Coconut water   Jell-O, Popsicles (no milk or fruit pieces - not red or purple)   Fat-free soup broth or bouillon   Plain hard candy, such as clear life savers (not red or  purple)   Clear juices and fruit-flavored drinks, such as apple juice, white grape juice, Hi-C, and Hector-Aid (not red or purple)  Milk or milk products such as ice cream, malts or shakes, or coffee creamer   Red or purple drinks of any kind such as cranberry juice, grape juice or Hector-Aid. Avoid red or purple Jell-O, Popsicles, sorbet, sherbet and candy   Juices with pulp such as orange, grapefruit, pineapple or tomato juice   Cream soups of any kind   Alcohol and beer   Protein drinks or protein powder     Step 1     At 4 PM, take 2 Dulcolax (Bisacodyl) tablets.   At 5 PM, mix the entire bottle of Miralax with 64 ounces of Gatorade in a pitcher and stir to dissolve the powder. Start drinking one 8-ounce glass of the Miralax and Gatorade mixture every 15 minutes until the pitcher is HALF empty (about 4 glasses).  Drink each glass quickly. Store the rest in the refrigerator.   Continue to drink clear liquids.    Step 2     At 10 PM, take 2 Dulcolax (Bisacodyl) tablets  At 10 PM start drinking the remainder of the Miralax and Gatorade mixture. Drink one 8-ounce glass of Miralax and Gatorade mixture every 15 minutes until the pitcher is empty (about 4 glasses). Drink each glass quickly.     Step 3     If you arrive for your procedure BEFORE 11 AM:  6 hours prior to your scheduled arrival to the endoscopy unit, drink 10 ounces of clear Magnesium Citrate.    If you arrive for your procedure AFTER 11 AM:  At 6 AM on the day of the exam drink 10 ounces of clear Magnesium Citrate.       Reminders While Drinking Laxatives:     After you start drinking the solution, stay near a toilet. You may have watery stools (diarrhea), mild cramping, bloating, and nausea. You may want to use Vaseline on the skin around your anus after each bowel movement or use wet wipes to prevent irritation. Bowel movements will be liquid and dark in color at first and then should turn clear yellow in color.      Some find it easier to drink the  Miralax and Gatorade mixture when it is chilled. Do not add ice as this will dilute the laxative. Drinking from a straw can be helpful to drink the liquid faster.     If you have nausea or vomiting during drinking the solution, rinse your mouth with water and take a 15-30 minute break and then continue drinking solution.       Day of procedure     2 hours before your arrival time stop drinking all liquids, including water.   Do not smoke or swallow anything, including water or gum for at least 2 hours before your arrival time. This is a safety issue. Your procedure could be cancelled if you do not follow directions.  No chewing tobacco 6 hours prior to procedure arrival time.     You may take your necessary morning medications with sips of water (4 ounces).   Do not take diabetes medicine by mouth until after your exam.  If you have asthma, bring your inhalers.  Please perform your nebulizer treatments and airway clearance therapy in the morning prior to the procedure (if applicable).    Arrive with a responsible adult who can drive you home and stay with you for up to 24 hours. The medications used during the procedure will make you sleepy, so you won't be able to drive yourself home.   You cannot use public transportation, ride-share services, or non-medical taxi services without a responsible caregiver. Medical transport services are okay, but a caregiver must be there to receive you at your destination.  Please check in with your  when you arrive. Drivers should stay on campus.    Expect to be at the procedure center for about 1.5-2.5 hours.    Do not wear jewelry (i.e. earrings, rings, necklaces, watches, etc.). Leave your purse, billfold, credit cards, and other valuables at home.      Bring insurance card and ID.       Answers to Commonly Asked Questions     How soon can I eat after the procedure?  You may resume your normal diet when you feel ready, unless advised otherwise by the doctor performing  your procedure. We recommend starting with a light meal.   Do not drink alcohol for 24 hours after your procedure.  You may resume normal activities (work, exercise, etc.) after 24 hours.    How might I feel after the procedure?  It is normal to feel bloated and gassy after your procedure. Walking will help move the air through your colon. You can take non-aspirin pain relievers that contain acetaminophen (Tylenol).  If you are having sedation, we require a responsible adult to take you home for your safety. The sedation medicines used to relax you during the procedure can impair your judgement and reaction time, and make you forgetful and possibly a little unsteady.  Do not drive, make any important decisions, or sign any legal documents for 24 hours after your procedure.    When will I get my test results?  You should have your procedure results and any lab results (if applicable) by letter, [employer] message, or phone call within 2 weeks. If you have any questions, please call the doctor that referred you for the procedure.    How do I know if my colon is cleaned out?   After completing the bowel prep, your bowel movements should be all liquid and yellow. Your bowel movements will look similar to urine in the toilet. If there are pieces of stool (poop) in the toilet, or if you can't see to the bottom of the toilet, please call our office for advice. Call [phone] and ask to speak with a nurse.    Why is the Miralax bowel prep taken in several steps?   The stool is flushed out by a large wave of fluid going through the colon. Just sipping a large volume of the solution will not achieve the desired result. Studies have shown that two smaller waves (or more in some cases) are better than one large one.      Why do I need to drink the magnesium citrate so close to the procedure arrival time?   The intestine continues to produce mucus and waste. Longer intervals between the prep and the exam can lead to less than  desired results. However, the stomach must be empty at the time of the exam in order to allow safe sedation. Therefore, there should be nothing by mouth 2 hours before the exam is started.    What if I need to cancel or reschedule my procedure?  Contact our endoscopy scheduling team at [phone], option 2. Monday through Friday, 7:00am-5:00pm.       7

## 2025-01-07 NOTE — ED PROVIDER NOTE - NS ED MD EM SELECTION
"Medication Therapy Management (MTM) Encounter    ASSESSMENT:                            Medication Adherence/Access: No issues identified.    Rheumatoid arthritis: Patient remains subjectively well controlled following switch from methotrexate to Humira due to creatinine elevations. Does not some mild breakthrough symptoms in fingers when due for next dose, would benefit from continued monitoring as consistent breakthrough may warrant increased dose frequency or alternative medication with increased dose frequency (I.e. Enbrel). Otherwise, no changes needed at this time.    Vaccine counseling: Indicated for COVID and flu boosters per ACIP recommendations.    PLAN:                            Will discuss updating ESR and CRP at next lab draw with provider per patient request.  Continue Humira 40 mg every 14 days. Report any infections, planned surgeries, or disease flares to the rheumatology department.  Consider the following vaccines: COVID and flu boosters.    Follow-up: 3 months, patient to reach out sooner if breakthrough symptoms worsen. No follow up currently scheduled with rheumatologist (intended for ~4/2025).    SUBJECTIVE/OBJECTIVE:                          Uri Galvez is a 53 year old male called for a follow up visit from 9/16/24. He was referred to me from Vero Santiago MD.    Reason for visit: Humira 3 month follow up.    Allergies/ADRs: Reviewed in chart  Past Medical History: Reviewed in chart  Tobacco: He reports that he has never smoked. He has never been exposed to tobacco smoke. He has never used smokeless tobacco.  Alcohol: not assessed today    Medication Adherence/Access: No issues identified.    Rheumatoid arthritis:   Humira 40 mg every 14 days    At September visit, patient switched from methotrexate to Humira due to elevated creatinine. He was well controlled on the methotrexate. Note he'd had a nephrectomy following RCC diagnosis in spring 2024. Today he reports things are going \"okay\" on " the Humira. Only change he has noticed is some occasional pain in fingers, tends to be when he is almost due for another dose. This was not happening with the methotrexate. He will be seeing his primary provider on 1/13 and will have his labs updated. He is wondering if he needs his inflammation markers updated at that appointment.    Vaccine counseling: Patient is generally open to receiving recommended vaccines.    Immunization History   Administered Date(s) Administered    COVID-19 12+ (Pfizer) 11/28/2023    COVID-19 MONOVALENT 12+ (Pfizer) 03/13/2021, 04/03/2021, 01/02/2022    Influenza Vaccine >6 months,quad, PF 11/28/2023    Pneumococcal 20 valent Conjugate (Prevnar 20) 11/28/2023    TDAP (Adacel,Boostrix) 09/21/2022    TDAP Vaccine (Adacel) 03/08/2012    Zoster recombinant adjuvanted (SHINGRIX) 09/19/2023, 03/28/2024      Today's Vitals: There were no vitals taken for this visit.  ----------------    I spent 10 minutes with this patient today. All changes were made via collaborative practice agreement with Vero Santiago MD. A copy of the visit note was provided to the patient's provider(s).    A summary of these recommendations was sent via [employer].    Diana Rodríguez, PharmD  Medication Therapy Management Pharmacist  [facility]     Telemedicine Visit Details  Type of service:  Telephone visit  Start Time: 1930  End Time: 0940     Medication Therapy Recommendations  No medication therapy recommendations to display   [id number] Comprehensive

## 2025-02-18 NOTE — ED ADULT TRIAGE NOTE - NS ED NURSE DIRECT TO ROOM YN
CAD-coronary artery disease-patient has a history of myocardial infarction/stent placed in stenosed coronary arteries. Target LDL should be below 70 milligrams per deciliter. Reviewed EKG which shows no significant changes. Follows with his cardiologist/ Dr. Kenrick Giordano. He needs to call us with any new symptoms of angina or shortness of breath. Last stress test was/last coronary catheterization was/. Need to address risk factors [employer] controlling cholesterol blood pressure and diabetes. Educate extensively diet. Patient needs to follow in rehabilitation/mild exercises daily.           No

## 2025-03-11 NOTE — ED ADULT NURSE NOTE - PRO INTERPRETER NEED 2
Received to from patient to inform CTN PCP appointment changed from 3/17/2025 at 4:00 pm to 3/13/2024 at 8:30 am for hospital f/u appointment.  
English